# Patient Record
Sex: MALE | Race: WHITE | NOT HISPANIC OR LATINO | Employment: FULL TIME
[De-identification: names, ages, dates, MRNs, and addresses within clinical notes are randomized per-mention and may not be internally consistent; named-entity substitution may affect disease eponyms.]

---

## 2017-12-14 ENCOUNTER — RECORDS - HEALTHEAST (OUTPATIENT)
Dept: ADMINISTRATIVE | Facility: OTHER | Age: 52
End: 2017-12-14

## 2018-01-08 ENCOUNTER — HOSPITAL ENCOUNTER (OUTPATIENT)
Dept: RADIOLOGY | Facility: CLINIC | Age: 53
Discharge: HOME | End: 2018-01-08
Attending: INTERNAL MEDICINE

## 2018-01-08 DIAGNOSIS — R13.10 DYSPHAGIA: ICD-10-CM

## 2018-01-08 DIAGNOSIS — K31.9 GASTRIC ERYTHEMA: ICD-10-CM

## 2018-03-16 ENCOUNTER — RECORDS - HEALTHEAST (OUTPATIENT)
Dept: LAB | Facility: CLINIC | Age: 53
End: 2018-03-16

## 2018-03-16 LAB
ALBUMIN SERPL-MCNC: 4.3 G/DL (ref 3.5–5)
ALP SERPL-CCNC: 43 U/L (ref 45–120)
ALT SERPL W P-5'-P-CCNC: 16 U/L (ref 0–45)
ANION GAP SERPL CALCULATED.3IONS-SCNC: 12 MMOL/L (ref 5–18)
AST SERPL W P-5'-P-CCNC: 24 U/L (ref 0–40)
BASOPHILS # BLD AUTO: 0 THOU/UL (ref 0–0.2)
BASOPHILS NFR BLD AUTO: 1 % (ref 0–2)
BILIRUB SERPL-MCNC: 1 MG/DL (ref 0–1)
BUN SERPL-MCNC: 20 MG/DL (ref 8–22)
CALCIUM SERPL-MCNC: 9.4 MG/DL (ref 8.5–10.5)
CHLORIDE BLD-SCNC: 101 MMOL/L (ref 98–107)
CO2 SERPL-SCNC: 23 MMOL/L (ref 22–31)
CREAT SERPL-MCNC: 1.15 MG/DL (ref 0.7–1.3)
EOSINOPHIL # BLD AUTO: 0 THOU/UL (ref 0–0.4)
EOSINOPHIL NFR BLD AUTO: 1 % (ref 0–6)
ERYTHROCYTE [DISTWIDTH] IN BLOOD BY AUTOMATED COUNT: 13.9 % (ref 11–14.5)
FOLATE SERPL-MCNC: 14 NG/ML
GFR SERPL CREATININE-BSD FRML MDRD: >60 ML/MIN/1.73M2
GLUCOSE BLD-MCNC: 86 MG/DL (ref 70–125)
HCT VFR BLD AUTO: 31 % (ref 40–54)
HGB BLD-MCNC: 11.3 G/DL (ref 14–18)
LYMPHOCYTES # BLD AUTO: 1.7 THOU/UL (ref 0.8–4.4)
LYMPHOCYTES NFR BLD AUTO: 28 % (ref 20–40)
MCH RBC QN AUTO: 41.7 PG (ref 27–34)
MCHC RBC AUTO-ENTMCNC: 36.5 G/DL (ref 32–36)
MCV RBC AUTO: 114 FL (ref 80–100)
MONOCYTES # BLD AUTO: 0.5 THOU/UL (ref 0–0.9)
MONOCYTES NFR BLD AUTO: 8 % (ref 2–10)
NEUTROPHILS # BLD AUTO: 3.7 THOU/UL (ref 2–7.7)
NEUTROPHILS NFR BLD AUTO: 63 % (ref 50–70)
PLATELET # BLD AUTO: 205 THOU/UL (ref 140–440)
PMV BLD AUTO: 9.9 FL (ref 8.5–12.5)
POTASSIUM BLD-SCNC: 3.8 MMOL/L (ref 3.5–5)
PROT SERPL-MCNC: 7.6 G/DL (ref 6–8)
RBC # BLD AUTO: 2.71 MILL/UL (ref 4.4–6.2)
SODIUM SERPL-SCNC: 136 MMOL/L (ref 136–145)
VIT B12 SERPL-MCNC: 388 PG/ML (ref 213–816)
WBC: 5.9 THOU/UL (ref 4–11)

## 2018-04-04 ENCOUNTER — RECORDS - HEALTHEAST (OUTPATIENT)
Dept: LAB | Facility: CLINIC | Age: 53
End: 2018-04-04

## 2018-04-04 LAB
IRON SATN MFR SERPL: 33 % (ref 20–50)
IRON SERPL-MCNC: 97 UG/DL (ref 42–175)
TIBC SERPL-MCNC: 298 UG/DL (ref 313–563)
TRANSFERRIN SERPL-MCNC: 238 MG/DL (ref 212–360)

## 2019-08-16 ENCOUNTER — RECORDS - HEALTHEAST (OUTPATIENT)
Dept: ADMINISTRATIVE | Facility: OTHER | Age: 54
End: 2019-08-16

## 2019-09-19 ENCOUNTER — HOSPITAL ENCOUNTER (OUTPATIENT)
Dept: MRI IMAGING | Facility: CLINIC | Age: 54
Discharge: HOME | End: 2019-09-19
Attending: INTERNAL MEDICINE

## 2019-09-19 DIAGNOSIS — K50.812: ICD-10-CM

## 2020-03-09 ENCOUNTER — RECORDS - HEALTHEAST (OUTPATIENT)
Dept: LAB | Facility: CLINIC | Age: 55
End: 2020-03-09

## 2020-03-09 LAB
BASOPHILS # BLD AUTO: 0 THOU/UL (ref 0–0.2)
BASOPHILS NFR BLD AUTO: 1 % (ref 0–2)
EOSINOPHIL # BLD AUTO: 0.2 THOU/UL (ref 0–0.4)
EOSINOPHIL NFR BLD AUTO: 3 % (ref 0–6)
ERYTHROCYTE [DISTWIDTH] IN BLOOD BY AUTOMATED COUNT: 13.8 % (ref 11–14.5)
HCT VFR BLD AUTO: 39.6 % (ref 40–54)
HGB BLD-MCNC: 13.2 G/DL (ref 14–18)
LYMPHOCYTES # BLD AUTO: 2.2 THOU/UL (ref 0.8–4.4)
LYMPHOCYTES NFR BLD AUTO: 37 % (ref 20–40)
MCH RBC QN AUTO: 36.7 PG (ref 27–34)
MCHC RBC AUTO-ENTMCNC: 33.3 G/DL (ref 32–36)
MCV RBC AUTO: 110 FL (ref 80–100)
MONOCYTES # BLD AUTO: 0.4 THOU/UL (ref 0–0.9)
MONOCYTES NFR BLD AUTO: 7 % (ref 2–10)
NEUTROPHILS # BLD AUTO: 3.1 THOU/UL (ref 2–7.7)
NEUTROPHILS NFR BLD AUTO: 51 % (ref 50–70)
PLATELET # BLD AUTO: 254 THOU/UL (ref 140–440)
PMV BLD AUTO: 9.7 FL (ref 8.5–12.5)
RBC # BLD AUTO: 3.6 MILL/UL (ref 4.4–6.2)
WBC: 6 THOU/UL (ref 4–11)

## 2021-05-31 ENCOUNTER — RECORDS - HEALTHEAST (OUTPATIENT)
Dept: ADMINISTRATIVE | Facility: CLINIC | Age: 56
End: 2021-05-31

## 2021-06-01 ENCOUNTER — RECORDS - HEALTHEAST (OUTPATIENT)
Dept: ADMINISTRATIVE | Facility: CLINIC | Age: 56
End: 2021-06-01

## 2021-06-02 ENCOUNTER — RECORDS - HEALTHEAST (OUTPATIENT)
Dept: ADMINISTRATIVE | Facility: CLINIC | Age: 56
End: 2021-06-02

## 2021-06-15 NOTE — PROGRESS NOTES
"Speech Language/Pathology  Videofluoroscopic Swallow Study       Problem:  Patient Active Problem List   Diagnosis     Concussion, unspecified     Vertigo     Concussion syndrome       Onset date: 10/2017  Reason for evaluation: globus sensation from   Pertinent History: MR on 2/1/15 with the following results: \"Areas of low attenuation within the midbrain, frankie, and right middle cerebellar peduncle on the prior head CT correspond to clustered CSF signal intensity structures without adjacent edema, significant mass effect, pathologic enhancement, or restricted diffusion. This appearance is most suggestive of clustered prominent perivascular spaces\". Crohn's disease. Reports change in taste. Gradual onset. Denies coughing with food or liquid.   Current Diet: regular with thin  Baseline Diet: same    Patient presents as alert and cooperative during this evaluation.   An  was not applicable    Patient was given puree, honey, nectar and thin.    Oral Phase:    Dentition/Oral hygiene: adequate    Bolus prep and oral control was mildly impaired.     Anterior-Posterior transit was not impaired.    Premature spillage past base of tongue occurred with all textures trialed.    Tongue base retraction was not impaired.    Oral stasis did not occur with any texture.    Pharyngeal Phase:    Aspiration did not occur with any texture.     Laryngeal penetration occurred with thin. Shallow and transient, isolated incident, during multiple consecutive swallows via straw.     Swallow response was delayed with all textures trialed. Pourover occurred with all textures trialed. To the valleculae with puree and honey-thick liquids, to the pyriform sinuses with nectar-thick and thin liquids.     Epiglottic movement was complete consistently across texture trials.    Pharyngeal stasis did not occur with any texture.     Pharyngeal constriction was not impaired.    Hyolaryngeal elevation was not impaired. Hyolaryngeal excursion was " not impaired.    Cricopharyngeal function was not impaired. Cervical esophageal function was not observed .    Assessment:    Patient demonstrated mild oral and mild pharyngeal dysphagia. Does not appear to be related to patient's reported symptoms and reason for evaluation.    Patient is at minimal aspiration risk with all intake.    Rehab potential is good based on evaluation results and medical status.    Recommendations:    Plan: Regular and thin liquids    Strategies: Upright to 90 degrees for all po intake    Speech therapy is not recommended at this time    Referrals: N/A    23 dysphagia minutes    Michael Worthington MA, CCC-SLP

## 2021-06-26 ENCOUNTER — HEALTH MAINTENANCE LETTER (OUTPATIENT)
Age: 56
End: 2021-06-26

## 2021-10-16 ENCOUNTER — HEALTH MAINTENANCE LETTER (OUTPATIENT)
Age: 56
End: 2021-10-16

## 2022-07-17 ENCOUNTER — HEALTH MAINTENANCE LETTER (OUTPATIENT)
Age: 57
End: 2022-07-17

## 2022-09-25 ENCOUNTER — HEALTH MAINTENANCE LETTER (OUTPATIENT)
Age: 57
End: 2022-09-25

## 2022-10-27 ENCOUNTER — APPOINTMENT (OUTPATIENT)
Dept: CT IMAGING | Facility: HOSPITAL | Age: 57
End: 2022-10-27
Attending: EMERGENCY MEDICINE
Payer: COMMERCIAL

## 2022-10-27 ENCOUNTER — HOSPITAL ENCOUNTER (EMERGENCY)
Facility: HOSPITAL | Age: 57
Discharge: HOME | End: 2022-10-27
Attending: EMERGENCY MEDICINE | Admitting: EMERGENCY MEDICINE
Payer: COMMERCIAL

## 2022-10-27 VITALS
SYSTOLIC BLOOD PRESSURE: 111 MMHG | HEART RATE: 68 BPM | OXYGEN SATURATION: 100 % | BODY MASS INDEX: 18.99 KG/M2 | TEMPERATURE: 98.4 F | RESPIRATION RATE: 20 BRPM | DIASTOLIC BLOOD PRESSURE: 67 MMHG | WEIGHT: 140 LBS

## 2022-10-27 DIAGNOSIS — N20.1 URETEROLITHIASIS: ICD-10-CM

## 2022-10-27 DIAGNOSIS — N23 RENAL COLIC: ICD-10-CM

## 2022-10-27 PROBLEM — E53.8 COBALAMIN DEFICIENCY: Status: ACTIVE | Noted: 2018-05-23

## 2022-10-27 PROBLEM — K31.9 DISORDER OF STOMACH: Status: ACTIVE | Noted: 2017-12-06

## 2022-10-27 PROBLEM — R13.10 DYSPHAGIA: Status: ACTIVE | Noted: 2017-12-04

## 2022-10-27 PROBLEM — E53.9 VITAMIN B DEFICIENCY: Status: ACTIVE | Noted: 2018-05-23

## 2022-10-27 PROBLEM — K20.90 ESOPHAGITIS: Status: ACTIVE | Noted: 2017-12-08

## 2022-10-27 LAB
ALBUMIN UR-MCNC: 20 MG/DL
ANION GAP SERPL CALCULATED.3IONS-SCNC: 11 MMOL/L (ref 7–15)
APPEARANCE UR: CLEAR
BILIRUB UR QL STRIP: NEGATIVE
BUN SERPL-MCNC: 15.3 MG/DL (ref 6–20)
CALCIUM SERPL-MCNC: 9.3 MG/DL (ref 8.6–10)
CAOX CRY #/AREA URNS HPF: ABNORMAL /HPF
CHLORIDE SERPL-SCNC: 104 MMOL/L (ref 98–107)
COLOR UR AUTO: YELLOW
CREAT SERPL-MCNC: 1.1 MG/DL (ref 0.67–1.17)
DEPRECATED HCO3 PLAS-SCNC: 24 MMOL/L (ref 22–29)
ERYTHROCYTE [DISTWIDTH] IN BLOOD BY AUTOMATED COUNT: 12.4 % (ref 10–15)
GFR SERPL CREATININE-BSD FRML MDRD: 79 ML/MIN/1.73M2
GLUCOSE SERPL-MCNC: 142 MG/DL (ref 70–99)
GLUCOSE UR STRIP-MCNC: NEGATIVE MG/DL
HCT VFR BLD AUTO: 38.6 % (ref 40–53)
HGB BLD-MCNC: 12.8 G/DL (ref 13.3–17.7)
HGB UR QL STRIP: ABNORMAL
HYALINE CASTS: 1 /LPF
KETONES UR STRIP-MCNC: NEGATIVE MG/DL
LEUKOCYTE ESTERASE UR QL STRIP: ABNORMAL
MCH RBC QN AUTO: 33.3 PG (ref 26.5–33)
MCHC RBC AUTO-ENTMCNC: 33.2 G/DL (ref 31.5–36.5)
MCV RBC AUTO: 101 FL (ref 78–100)
MUCOUS THREADS #/AREA URNS LPF: PRESENT /LPF
NITRATE UR QL: NEGATIVE
PH UR STRIP: 5.5 [PH] (ref 5–7)
PLATELET # BLD AUTO: 257 10E3/UL (ref 150–450)
POTASSIUM SERPL-SCNC: 3.9 MMOL/L (ref 3.4–5.3)
RBC # BLD AUTO: 3.84 10E6/UL (ref 4.4–5.9)
RBC URINE: 83 /HPF
SODIUM SERPL-SCNC: 139 MMOL/L (ref 136–145)
SP GR UR STRIP: 1.02 (ref 1–1.03)
SQUAMOUS EPITHELIAL: <1 /HPF
UROBILINOGEN UR STRIP-MCNC: <2 MG/DL
WBC # BLD AUTO: 6.7 10E3/UL (ref 4–11)
WBC URINE: 30 /HPF

## 2022-10-27 PROCEDURE — 80048 BASIC METABOLIC PNL TOTAL CA: CPT | Performed by: EMERGENCY MEDICINE

## 2022-10-27 PROCEDURE — 96375 TX/PRO/DX INJ NEW DRUG ADDON: CPT

## 2022-10-27 PROCEDURE — 74176 CT ABD & PELVIS W/O CONTRAST: CPT

## 2022-10-27 PROCEDURE — 96376 TX/PRO/DX INJ SAME DRUG ADON: CPT

## 2022-10-27 PROCEDURE — 96361 HYDRATE IV INFUSION ADD-ON: CPT

## 2022-10-27 PROCEDURE — 250N000011 HC RX IP 250 OP 636: Performed by: EMERGENCY MEDICINE

## 2022-10-27 PROCEDURE — 99285 EMERGENCY DEPT VISIT HI MDM: CPT | Mod: 25

## 2022-10-27 PROCEDURE — 85027 COMPLETE CBC AUTOMATED: CPT | Performed by: EMERGENCY MEDICINE

## 2022-10-27 PROCEDURE — 81001 URINALYSIS AUTO W/SCOPE: CPT | Performed by: EMERGENCY MEDICINE

## 2022-10-27 PROCEDURE — 96374 THER/PROPH/DIAG INJ IV PUSH: CPT

## 2022-10-27 PROCEDURE — 36415 COLL VENOUS BLD VENIPUNCTURE: CPT | Performed by: EMERGENCY MEDICINE

## 2022-10-27 PROCEDURE — 87086 URINE CULTURE/COLONY COUNT: CPT | Performed by: EMERGENCY MEDICINE

## 2022-10-27 PROCEDURE — 258N000003 HC RX IP 258 OP 636: Performed by: EMERGENCY MEDICINE

## 2022-10-27 RX ORDER — KETOROLAC TROMETHAMINE 15 MG/ML
15 INJECTION, SOLUTION INTRAMUSCULAR; INTRAVENOUS ONCE
Status: COMPLETED | OUTPATIENT
Start: 2022-10-27 | End: 2022-10-27

## 2022-10-27 RX ORDER — ONDANSETRON 4 MG/1
4 TABLET, ORALLY DISINTEGRATING ORAL EVERY 6 HOURS PRN
Qty: 10 TABLET | Refills: 0 | Status: SHIPPED | OUTPATIENT
Start: 2022-10-27

## 2022-10-27 RX ORDER — IBUPROFEN 600 MG/1
600 TABLET, FILM COATED ORAL EVERY 6 HOURS PRN
Qty: 30 TABLET | Refills: 0 | Status: SHIPPED | OUTPATIENT
Start: 2022-10-27

## 2022-10-27 RX ORDER — ACETAMINOPHEN 500 MG
1000 TABLET ORAL 3 TIMES DAILY
Qty: 30 TABLET | Refills: 0 | Status: SHIPPED | OUTPATIENT
Start: 2022-10-27

## 2022-10-27 RX ORDER — OXYCODONE HYDROCHLORIDE 5 MG/1
5 TABLET ORAL EVERY 6 HOURS PRN
Qty: 12 TABLET | Refills: 0 | Status: SHIPPED | OUTPATIENT
Start: 2022-10-27 | End: 2022-10-30

## 2022-10-27 RX ORDER — ONDANSETRON 2 MG/ML
4 INJECTION INTRAMUSCULAR; INTRAVENOUS ONCE
Status: COMPLETED | OUTPATIENT
Start: 2022-10-27 | End: 2022-10-27

## 2022-10-27 RX ORDER — HYDROMORPHONE HYDROCHLORIDE 1 MG/ML
0.5 INJECTION, SOLUTION INTRAMUSCULAR; INTRAVENOUS; SUBCUTANEOUS
Status: DISCONTINUED | OUTPATIENT
Start: 2022-10-27 | End: 2022-10-27 | Stop reason: HOSPADM

## 2022-10-27 RX ADMIN — KETOROLAC TROMETHAMINE 15 MG: 15 INJECTION, SOLUTION INTRAMUSCULAR; INTRAVENOUS at 04:01

## 2022-10-27 RX ADMIN — SODIUM CHLORIDE 1000 ML: 9 INJECTION, SOLUTION INTRAVENOUS at 01:57

## 2022-10-27 RX ADMIN — ONDANSETRON 4 MG: 2 INJECTION INTRAMUSCULAR; INTRAVENOUS at 01:51

## 2022-10-27 RX ADMIN — KETOROLAC TROMETHAMINE 15 MG: 15 INJECTION, SOLUTION INTRAMUSCULAR; INTRAVENOUS at 01:51

## 2022-10-27 ASSESSMENT — ACTIVITIES OF DAILY LIVING (ADL): ADLS_ACUITY_SCORE: 35

## 2022-10-27 ASSESSMENT — ENCOUNTER SYMPTOMS
VOMITING: 0
FLANK PAIN: 1
DIARRHEA: 0
BACK PAIN: 1
CHILLS: 0
DIAPHORESIS: 0
FEVER: 0

## 2022-10-27 NOTE — DISCHARGE INSTRUCTIONS
I recommend taking 600 mg of ibuprofen three times a day in addition to 1000 mg of tylenol three times a day. Its OK to take these at the same time during this period of severe pain. After a couple of days you can take either tylenol (every 6 hours) or ibuprofen (every four hours) as needed.    For breakthrough pain you can take 5-10 mg of oxycodone every 4-6 hours.     Zofran will help with nausea.  You can take this as needed.    Please call [facility] urology, let them know that you have a 1.3 cm kidney stone that your right UVJ.  Let them know how your symptoms are doing.  They will help schedule follow-up.    If you are taking all of the medications and your pain is still severe, or you are vomiting and cannot keep your medications down, please come back to the emergency department for assistance.  If you develop a fever we would like to see you back immediately.

## 2022-10-27 NOTE — ED PROVIDER NOTES
EMERGENCY DEPARTMENT ENCOUNTER      NAME: Denys García  AGE: 57 year old male  YOB: 1965  MRN: [id number]  EVALUATION DATE & TIME: 10/27/2022  1:35 AM    PCP: Susan Jacobson    ED PROVIDER: Herson Luciano M.D.      Chief Complaint   Patient presents with     Flank Pain         FINAL IMPRESSION:  1. Ureterolithiasis    2. Renal colic          ED COURSE & MEDICAL DECISION MAKING:    Pertinent Labs & Imaging studies reviewed. (See chart for details)  ED Course as of 10/27/22 0347   Thu Oct 27, 2022   0144 Patient is a 57-year-old gentleman with a history of Crohn's, on Remicade, history of kidney stones, here with sudden onset right flank pain in the last few hours.  On exam he is uncomfortable appearing, has tenderness in the right lower quadrant, but pain began in the right flank.  Plan to give IV fluids, Toradol, Zofran, obtain CT scan to rule out kidney stone.  Differential also includes appendicitis, bowel obstruction however this is less likely given the clinical presentation.   0238 Based on my interpretation patient has a 1.25 cm stone at the right UVJ.  Right-sided hydro.  Awaiting radiology interpretation   0329 Rechecked the patient.  He is feeling much better after Toradol.  He has seen [facility] in the past for his kidney stones.  I will send him home with NSAIDs, Zofran, oxycodone, and instructions on what to say when he calls the clinic in the morning.  We discussed return precautions.   0337 Urinalysis has RBCs, WBCs, but negative nitrate.  He is afebrile with normal white blood cell count.  I do not think he has an infected stone.         Additional ED Course Timestamps:    1:40 AM I met with the patient to gather history and to perform my initial exam. I discussed the plan for care while in the Emergency Department. PPE (N95 mask) was worn during patient encounters.   3:22 AM I updated and reevaluated patient.    At the conclusion of the encounter I discussed the results  of all of the tests and the disposition. The questions were answered. The patient or family acknowledged understanding and was agreeable with the care plan.         MEDICATIONS GIVEN IN THE EMERGENCY:  Medications   HYDROmorphone (PF) (DILAUDID) injection 0.5 mg (has no administration in time range)   0.9% sodium chloride BOLUS (0 mLs Intravenous Stopped 10/27/22 0306)   ketorolac (TORADOL) injection 15 mg (15 mg Intravenous Given 10/27/22 0151)   ondansetron (ZOFRAN) injection 4 mg (4 mg Intravenous Given 10/27/22 0151)         NEW PRESCRIPTIONS STARTED AT TODAY'S ER VISIT  New Prescriptions    ACETAMINOPHEN (TYLENOL) 500 MG TABLET    Take 2 tablets (1,000 mg) by mouth 3 times daily    IBUPROFEN (ADVIL/MOTRIN) 600 MG TABLET    Take 1 tablet (600 mg) by mouth every 6 hours as needed for moderate pain    ONDANSETRON (ZOFRAN ODT) 4 MG ODT TAB    Take 1 tablet (4 mg) by mouth every 6 hours as needed for nausea    OXYCODONE (ROXICODONE) 5 MG TABLET    Take 1 tablet (5 mg) by mouth every 6 hours as needed for pain          =================================================================    HPI    Patient information was obtained from: Patient    Use of : N/A        Denys García is a 57 year old male with a pertinent history of Crohn's disease and regional enteritis who presents to this ED for evaluation of flank pain. Patient states that at 2200 (~3 hours ago) he developed right-sided flank pain that radiates into his right lower back. Patient notes that he has had a kidney stone before that was left-sided and he had nausea and diaphoresis. He has not taken any medications to relieve pain. Denies vomiting, diaphoresis, fever, chills, and diarrhea. Patient is on remicade that he takes every 8 weeks.         REVIEW OF SYSTEMS   Review of Systems   Constitutional: Negative for chills, diaphoresis and fever.   Gastrointestinal: Negative for diarrhea and vomiting.   Genitourinary: Positive for flank pain  (right).   Musculoskeletal: Positive for back pain (right lower).       PAST MEDICAL HISTORY:  No past medical history on file.    PAST SURGICAL HISTORY:  Past Surgical History:   Procedure Laterality Date     APPENDECTOMY       IR AORTIC ARCH 4 VESSEL ANGIOGRAM  4/14/2008     IR MISCELLANEOUS PROCEDURE  4/14/2008     IR MISCELLANEOUS PROCEDURE  4/14/2008     IR MISCELLANEOUS PROCEDURE  4/14/2008     SMALL INTESTINE SURGERY      partial small bowel resection for crohns           CURRENT MEDICATIONS:    Current Facility-Administered Medications   Medication     HYDROmorphone (PF) (DILAUDID) injection 0.5 mg     Current Outpatient Medications   Medication     acetaminophen (TYLENOL) 500 MG tablet     ibuprofen (ADVIL/MOTRIN) 600 MG tablet     ondansetron (ZOFRAN ODT) 4 MG ODT tab     oxyCODONE (ROXICODONE) 5 MG tablet     aspirin-acetaminophen-caffeine (EXCEDRIN MIGRAINE) 250-250-65 mg per tablet     inFLIXimab (REMICADE) 100 mg injection       ALLERGIES:  Allergies   Allergen Reactions     Contrast Dye Rash     Itchy about five years ago  Itchy about five years ago       Iodinated Contrast Media [Diagnostic X-Ray Materials] Rash     Itchy about five years ago       FAMILY HISTORY:  No family history on file.    SOCIAL HISTORY:   Social History     Socioeconomic History     Marital status:    Substance and Sexual Activity     Sexual activity: Not Currently       VITALS:  /74   Pulse 77   Temp 98.4  F (36.9  C) (Temporal)   Resp 20   Wt 63.5 kg (140 lb)   SpO2 100%   BMI 18.99 kg/m      PHYSICAL EXAM    Constitutional: Well developed, well nourished. Uncomfortable appearing.  HENT: Normocephalic, atraumatic, mucous membranes moist, nose normal. Neck- Supple, gross ROM intact.   Eyes: Pupils mid-range, conjunctiva without injection, no discharge.   Respiratory: Clear to auscultation bilaterally, no respiratory distress, no wheezing, speaks full sentences easily. No cough.  Cardiovascular: Normal  heart rate, regular rhythm, no murmurs.   GI: Soft, no masses. RLQ tenderness and guarding.  Musculoskeletal: Moving all 4 extremities intentionally and without pain. No obvious deformity.  Skin: Warm, dry, no rash.  Neurologic: Alert & oriented x 3, cranial nerves grossly intact.  Psychiatric: Affect normal, cooperative.       LAB:  All pertinent labs reviewed and interpreted.  Labs Ordered and Resulted from Time of ED Arrival to Time of ED Departure   CBC WITH PLATELETS - Abnormal       Result Value    WBC Count 6.7      RBC Count 3.84 (*)     Hemoglobin 12.8 (*)     Hematocrit 38.6 (*)      (*)     MCH 33.3 (*)     MCHC 33.2      RDW 12.4      Platelet Count 257     BASIC METABOLIC PANEL - Abnormal    Sodium 139      Potassium 3.9      Chloride 104      Carbon Dioxide (CO2) 24      Anion Gap 11      Urea Nitrogen 15.3      Creatinine 1.10      Calcium 9.3      Glucose 142 (*)     GFR Estimate 79     ROUTINE UA WITH MICROSCOPIC REFLEX TO CULTURE - Abnormal    Color Urine Yellow      Appearance Urine Clear      Glucose Urine Negative      Bilirubin Urine Negative      Ketones Urine Negative      Specific Gravity Urine 1.024      Blood Urine 0.2 mg/dL (*)     pH Urine 5.5      Protein Albumin Urine 20 (*)     Urobilinogen Urine <2.0      Nitrite Urine Negative      Leukocyte Esterase Urine 75 Valdo/uL (*)     Mucus Urine Present (*)     Calcium Oxalate Crystals Urine Few (*)     RBC Urine 83 (*)     WBC Urine 30 (*)     Squamous Epithelials Urine <1      Hyaline Casts Urine 1     URINE CULTURE       RADIOLOGY:  Reviewed all pertinent imaging. Please see official radiology report.  Abd/pelvis CT no contrast - Stone Protocol   Final Result   IMPRESSION:    1.  1.3 cm obstructing stone at the right ureteral vesicular junction associated with moderate upstream right hydroureteronephrosis. Just upstream of the dominant stone there is an additional 4 mm calculus in the distal right ureter.   2.  Numerous  nonobstructing calyceal calculi of both kidneys the largest of which on the right measures 4 mm and the largest on the left measures 1.0 cm.             EKG:    All EKG interpretations will be found in ED course above.      I, Carmen Bautista am serving as a scribe to document services personally performed by Dr. Herson Luciano based on my observation and the provider's statements to me. I, Herson Luciano MD attest that Carmen Bautista is acting in a scribe capacity, has observed my performance of the services and has documented them in accordance with my direction.    Herson Luciano M.D.  Emergency Medicine  [facility] EMERGENCY DEPARTMENT  [facility] [facility] [id number] [phone]  Dept: [phone]     Herson Luciano MD  10/27/22 0349

## 2022-10-27 NOTE — Clinical Note
Denys García was seen and treated in our emergency department on 10/27/2022.  He may return to work on 10/31/2022.       If you have any questions or concerns, please don't hesitate to call.      Herson Luciano MD

## 2022-10-27 NOTE — ED TRIAGE NOTES
Right flank pain started at work 1 hour ago.  No vomiting or diarrhea.  History of kidney stones     Triage Assessment     Row Name 10/27/22 0133       Triage Assessment (Adult)    Airway WDL WDL       Respiratory WDL    Respiratory WDL WDL       Skin Circulation/Temperature WDL    Skin Circulation/Temperature WDL WDL       Cardiac WDL    Cardiac WDL WDL       Peripheral/Neurovascular WDL    Peripheral Neurovascular WDL WDL       Cognitive/Neuro/Behavioral WDL    Cognitive/Neuro/Behavioral WDL WDL

## 2022-10-28 LAB — BACTERIA UR CULT: NORMAL

## 2022-11-22 ENCOUNTER — HOSPITAL ENCOUNTER (EMERGENCY)
Facility: HOSPITAL | Age: 57
Discharge: HOME | End: 2022-11-22
Attending: EMERGENCY MEDICINE | Admitting: EMERGENCY MEDICINE
Payer: COMMERCIAL

## 2022-11-22 VITALS
DIASTOLIC BLOOD PRESSURE: 85 MMHG | SYSTOLIC BLOOD PRESSURE: 113 MMHG | OXYGEN SATURATION: 100 % | RESPIRATION RATE: 20 BRPM | HEART RATE: 75 BPM | TEMPERATURE: 98.9 F | WEIGHT: 148 LBS | BODY MASS INDEX: 20.05 KG/M2 | HEIGHT: 72 IN

## 2022-11-22 DIAGNOSIS — R33.9 URINARY RETENTION: ICD-10-CM

## 2022-11-22 LAB
ALBUMIN UR-MCNC: 50 MG/DL
APPEARANCE UR: ABNORMAL
BILIRUB UR QL STRIP: NEGATIVE
CAOX CRY #/AREA URNS HPF: ABNORMAL /HPF
COLOR UR AUTO: YELLOW
GLUCOSE UR STRIP-MCNC: NEGATIVE MG/DL
HGB UR QL STRIP: ABNORMAL
HYALINE CASTS: 1 /LPF
KETONES UR STRIP-MCNC: NEGATIVE MG/DL
LEUKOCYTE ESTERASE UR QL STRIP: ABNORMAL
MUCOUS THREADS #/AREA URNS LPF: PRESENT /LPF
NITRATE UR QL: NEGATIVE
PH UR STRIP: 5.5 [PH] (ref 5–7)
RBC URINE: 56 /HPF
SP GR UR STRIP: 1.03 (ref 1–1.03)
SQUAMOUS EPITHELIAL: <1 /HPF
UROBILINOGEN UR STRIP-MCNC: <2 MG/DL
WBC URINE: 28 /HPF

## 2022-11-22 PROCEDURE — 87086 URINE CULTURE/COLONY COUNT: CPT | Performed by: EMERGENCY MEDICINE

## 2022-11-22 PROCEDURE — 51702 INSERT TEMP BLADDER CATH: CPT

## 2022-11-22 PROCEDURE — 51798 US URINE CAPACITY MEASURE: CPT

## 2022-11-22 PROCEDURE — 99284 EMERGENCY DEPT VISIT MOD MDM: CPT

## 2022-11-22 PROCEDURE — 250N000009 HC RX 250: Performed by: EMERGENCY MEDICINE

## 2022-11-22 PROCEDURE — 81001 URINALYSIS AUTO W/SCOPE: CPT | Performed by: EMERGENCY MEDICINE

## 2022-11-22 PROCEDURE — 250N000013 HC RX MED GY IP 250 OP 250 PS 637: Performed by: EMERGENCY MEDICINE

## 2022-11-22 RX ORDER — LIDOCAINE HYDROCHLORIDE 20 MG/ML
10 JELLY TOPICAL ONCE
Status: COMPLETED | OUTPATIENT
Start: 2022-11-22 | End: 2022-11-22

## 2022-11-22 RX ORDER — HYDROCODONE BITARTRATE AND ACETAMINOPHEN 5; 325 MG/1; MG/1
1 TABLET ORAL ONCE
Status: COMPLETED | OUTPATIENT
Start: 2022-11-22 | End: 2022-11-22

## 2022-11-22 RX ADMIN — HYDROCODONE BITARTRATE AND ACETAMINOPHEN 1 TABLET: 5; 325 TABLET ORAL at 03:17

## 2022-11-22 RX ADMIN — LIDOCAINE HYDROCHLORIDE 10 ML: 20 JELLY TOPICAL at 03:08

## 2022-11-22 ASSESSMENT — ACTIVITIES OF DAILY LIVING (ADL): ADLS_ACUITY_SCORE: 35

## 2022-11-22 NOTE — ED PROVIDER NOTES
EMERGENCY DEPARTMENT ENCOUnter      NAME: Denys García  AGE: 57 year old male  YOB: 1965  MRN: [id number]  EVALUATION DATE & TIME: 2022  2:03 AM    PCP: Susan Jacobson    ED PROVIDER: Ricky Ward DO      Chief Complaint   Patient presents with     unable to urinate         FINAL IMPRESSION:  1. Urinary retention          ED COURSE & MEDICAL DECISION MAKIN:14 AM I performed my initial interview and exam. Patient was seen in ED room 23.       The patient presented to the emergency department today with complaints of urinary retention.  Greater than 1 L of urine was noted on his bladder scan.  A Paul catheter was placed and he is feeling much better after this.  He has an appointment to see his urologist soon which I have encouraged him to keep.  I feel that he can be safely discharged home and he is comfortable with this plan.      Medical Decision Making    History:    Supplemental history from: N/A    External Record(s) reviewed: Documented in HPI, if applicable.    Work Up:    Chart documentation includes differential considered and any EKGs or imaging interpreted by provider.    In additional to work up documented, I considered the following work up: See chart documentation, if applicable.    External consultation:    Discussion of management with another provider: See chart documentation, if applicable    Complicating factors:    Care impacted by chronic illness: None    Care affected by social determinants of health: N/A    Disposition considerations: Discharge. No recommendations on prescription strength medication(s). I considered admission, but discharged patient after significant clinical improvement.        At the conclusion of the encounter I discussed the results of all of the tests and the disposition. The questions were answered. The patient or family acknowledged understanding and was agreeable with the care plan.         MEDICATIONS GIVEN IN THE  EMERGENCY:  Medications   lidocaine (XYLOCAINE) 2 % external gel 10 mL (10 mLs Urethral Given 11/22/22 0308)   HYDROcodone-acetaminophen (NORCO) 5-325 MG per tablet 1 tablet (1 tablet Oral Given 11/22/22 0317)       =================================================================    HPI      Denys García is a 57 year old male with a pertinent history of Crohn's disease, regional enteritis, and calculus of kidney, who presents to this ED by walk in for evaluation of urinary retention.    Patient reports that he has a known 1.5 cm kidney stone and is scheduled to have it surgically removed on 11/23. Patient reports that tonight he was unable to urinate due to a sharp pain. Patient last urinated regularly this morning. Patient denies flank pain or any other concerns at this time.    REVIEW OF SYSTEMS     Constitutional:  Denies fever or chills  HENT:  Denies sore throat   Respiratory:  Denies cough or shortness of breath   Cardiovascular:  Denies chest pain or palpitations  GI:  Denies abdominal pain, nausea, or vomiting  : Endorses urinary retention.  Musculoskeletal:  Denies any new extremity pain   Skin:  Denies rash   Neurologic:  Denies headache, focal weakness or sensory changes    All other systems reviewed and are negative      PAST MEDICAL HISTORY:  No past medical history on file.    PAST SURGICAL HISTORY:  Past Surgical History:   Procedure Laterality Date     APPENDECTOMY       IR AORTIC ARCH 4 VESSEL ANGIOGRAM  4/14/2008     IR MISCELLANEOUS PROCEDURE  4/14/2008     IR MISCELLANEOUS PROCEDURE  4/14/2008     IR MISCELLANEOUS PROCEDURE  4/14/2008     SMALL INTESTINE SURGERY      partial small bowel resection for crohns           CURRENT MEDICATIONS:    acetaminophen (TYLENOL) 500 MG tablet  aspirin-acetaminophen-caffeine (EXCEDRIN MIGRAINE) 250-250-65 mg per tablet  ibuprofen (ADVIL/MOTRIN) 600 MG tablet  inFLIXimab (REMICADE) 100 mg injection  ondansetron (ZOFRAN ODT) 4 MG ODT  tab        ALLERGIES:  Allergies   Allergen Reactions     Contrast Dye Rash     Itchy about five years ago  Itchy about five years ago       Iodinated Contrast Media [Diagnostic X-Ray Materials] Rash     Itchy about five years ago       FAMILY HISTORY:  No family history on file.    SOCIAL HISTORY:   Social History     Socioeconomic History     Marital status:    Substance and Sexual Activity     Sexual activity: Not Currently       VITALS:  Patient Vitals for the past 24 hrs:   BP Temp Temp src Pulse Resp SpO2 Height Weight   11/22/22 0315 127/73 -- -- -- -- -- -- --   11/22/22 0300 120/85 -- -- -- -- -- -- --   11/22/22 0245 118/72 -- -- -- -- -- -- --   11/22/22 0244 -- -- -- 88 -- 100 % -- --   11/22/22 0230 128/76 -- -- 87 -- 100 % -- --   11/22/22 0215 117/70 -- -- 90 -- 100 % -- --   11/22/22 0208 122/86 -- -- 95 -- 100 % -- --   11/22/22 0159 (!) 134/94 98.9  F (37.2  C) Temporal 100 20 100 % 1.829 m (6') 67.1 kg (148 lb)       PHYSICAL EXAM    Constitutional:  Well developed, Well nourished,  HENT:  Normocephalic, Atraumatic, Bilateral external ears normal, Oropharynx moist, Nose normal.   Neck:  Normal range of motion, No meningismus, No stridor.   Eyes:  EOMI, Conjunctiva normal, No discharge.   Respiratory:  Normal breath sounds, No respiratory distress, No wheezing, No chest tenderness.   Cardiovascular:  Normal heart rate, Normal rhythm  GI:  Soft, mild suprapubic tenderness, No guarding, No CVA tenderness.   Musculoskeletal:   No tenderness to palpation or major deformities noted.   Integument:  Warm, Dry, No erythema, No rash.   Neurologic:  Alert & oriented x 3, Normal motor function, Normal sensory function, No focal deficits noted.   Psychiatric:  Affect normal, Judgment normal, Mood normal.          Ha DARLING, am serving as a scribe to document services personally performed by Dr. Ward based on my observation and the provider's statements to me. I, Ricky Ward, DO attest that  Ha Spivey is acting in a scribe capacity, has observed my performance of the services and has documented them in accordance with my direction.    Ricky Ward   Emergency Medicine  [facility] EMERGENCY DEPARTMENT  [address] [place] [id number] [phone]  Dept: [phone]       Ricky Ward MD  11/22/22 0416

## 2022-11-22 NOTE — ED NOTES
Paul catheter placed using coude catheter. Unable to advance catheter using standard 16 [place] catheter.

## 2022-11-22 NOTE — DISCHARGE INSTRUCTIONS
A Paul catheter was placed in your bladder today.  Keep this in place and follow-up with your urologist in the next several days.  Return to the ER for any worsening symptoms or other concerns.

## 2022-11-22 NOTE — ED TRIAGE NOTES
Pt here with the inability to urinate. States his last normal urination was this morning. Pt states he knows he has a kidney stone and believes it to be trying to pass. He has does not have any flank pain.     Triage Assessment     Row Name 11/22/22 0201       Triage Assessment (Adult)    Airway WDL WDL       Respiratory WDL    Respiratory WDL WDL       Skin Circulation/Temperature WDL    Skin Circulation/Temperature WDL WDL       Cardiac WDL    Cardiac WDL WDL       Peripheral/Neurovascular WDL    Peripheral Neurovascular WDL WDL       Cognitive/Neuro/Behavioral WDL    Cognitive/Neuro/Behavioral WDL WDL

## 2022-11-23 LAB — BACTERIA UR CULT: NO GROWTH

## 2023-05-24 ENCOUNTER — LAB REQUISITION (OUTPATIENT)
Dept: LAB | Facility: CLINIC | Age: 58
End: 2023-05-24

## 2023-05-24 LAB
ERYTHROCYTE [DISTWIDTH] IN BLOOD BY AUTOMATED COUNT: 14 % (ref 10–15)
HCT VFR BLD AUTO: 38.9 % (ref 40–53)
HGB BLD-MCNC: 12.4 G/DL (ref 13.3–17.7)
MCH RBC QN AUTO: 32 PG (ref 26.5–33)
MCHC RBC AUTO-ENTMCNC: 31.9 G/DL (ref 31.5–36.5)
MCV RBC AUTO: 100 FL (ref 78–100)
PLATELET # BLD AUTO: 258 10E3/UL (ref 150–450)
RBC # BLD AUTO: 3.88 10E6/UL (ref 4.4–5.9)
WBC # BLD AUTO: 7.2 10E3/UL (ref 4–11)

## 2023-05-24 PROCEDURE — 85027 COMPLETE CBC AUTOMATED: CPT | Performed by: NURSE PRACTITIONER

## 2023-06-05 ENCOUNTER — LAB REQUISITION (OUTPATIENT)
Dept: LAB | Facility: CLINIC | Age: 58
End: 2023-06-05
Payer: COMMERCIAL

## 2023-06-05 DIAGNOSIS — M72.0 PALMAR FASCIAL FIBROMATOSIS (DUPUYTREN): ICD-10-CM

## 2023-06-05 PROCEDURE — 88304 TISSUE EXAM BY PATHOLOGIST: CPT | Mod: 26 | Performed by: PATHOLOGY

## 2023-06-05 PROCEDURE — 88304 TISSUE EXAM BY PATHOLOGIST: CPT | Mod: TC,ORL | Performed by: STUDENT IN AN ORGANIZED HEALTH CARE EDUCATION/TRAINING PROGRAM

## 2023-06-07 LAB
PATH REPORT.COMMENTS IMP SPEC: NORMAL
PATH REPORT.COMMENTS IMP SPEC: NORMAL
PATH REPORT.FINAL DX SPEC: NORMAL
PATH REPORT.GROSS SPEC: NORMAL
PATH REPORT.MICROSCOPIC SPEC OTHER STN: NORMAL
PATH REPORT.RELEVANT HX SPEC: NORMAL
PHOTO IMAGE: NORMAL

## 2023-08-05 ENCOUNTER — HEALTH MAINTENANCE LETTER (OUTPATIENT)
Age: 58
End: 2023-08-05

## 2023-11-01 ENCOUNTER — LAB REQUISITION (OUTPATIENT)
Dept: LAB | Facility: CLINIC | Age: 58
End: 2023-11-01

## 2023-11-01 DIAGNOSIS — Z01.818 ENCOUNTER FOR OTHER PREPROCEDURAL EXAMINATION: ICD-10-CM

## 2023-11-01 LAB
ERYTHROCYTE [DISTWIDTH] IN BLOOD BY AUTOMATED COUNT: 12.9 % (ref 10–15)
HCT VFR BLD AUTO: 38.4 % (ref 40–53)
HGB BLD-MCNC: 12.8 G/DL (ref 13.3–17.7)
MCH RBC QN AUTO: 33.2 PG (ref 26.5–33)
MCHC RBC AUTO-ENTMCNC: 33.3 G/DL (ref 31.5–36.5)
MCV RBC AUTO: 100 FL (ref 78–100)
PLATELET # BLD AUTO: 223 10E3/UL (ref 150–450)
RBC # BLD AUTO: 3.86 10E6/UL (ref 4.4–5.9)
WBC # BLD AUTO: 8 10E3/UL (ref 4–11)

## 2023-11-01 PROCEDURE — 85027 COMPLETE CBC AUTOMATED: CPT | Performed by: NURSE PRACTITIONER

## 2023-12-14 ENCOUNTER — HOSPITAL ENCOUNTER (OUTPATIENT)
Dept: ULTRASOUND IMAGING | Facility: HOSPITAL | Age: 58
Discharge: HOME | End: 2023-12-14
Attending: FAMILY MEDICINE | Admitting: FAMILY MEDICINE
Payer: COMMERCIAL

## 2023-12-14 DIAGNOSIS — M79.662 PAIN OF LEFT CALF: ICD-10-CM

## 2023-12-14 PROCEDURE — 93971 EXTREMITY STUDY: CPT | Mod: LT

## 2024-01-23 ENCOUNTER — LAB (OUTPATIENT)
Dept: LAB | Facility: HOSPITAL | Age: 59
End: 2024-01-23
Payer: COMMERCIAL

## 2024-01-23 DIAGNOSIS — H02.409 DROOPING EYELID: Primary | ICD-10-CM

## 2024-01-23 LAB
Lab: NORMAL
PERFORMING LABORATORY: NORMAL
SPECIMEN STATUS: NORMAL
TEST NAME: NORMAL

## 2024-01-23 PROCEDURE — 83516 IMMUNOASSAY NONANTIBODY: CPT

## 2024-01-23 PROCEDURE — 84999 UNLISTED CHEMISTRY PROCEDURE: CPT

## 2024-01-23 PROCEDURE — 83519 RIA NONANTIBODY: CPT

## 2024-01-23 PROCEDURE — 86366 MUSCLE-SPECIFIC KINASE ANTB: CPT

## 2024-01-23 PROCEDURE — 36415 COLL VENOUS BLD VENIPUNCTURE: CPT

## 2024-01-23 PROCEDURE — 83516 IMMUNOASSAY NONANTIBODY: CPT | Mod: XU

## 2024-01-25 LAB
ACHR BLOCK AB/ACHR TOTAL SFR SER: 10 %
ACHR MOD AB/ACHR TOTAL SFR SER: 0 %

## 2024-01-26 LAB — ACHR BIND AB SER-SCNC: 0.4 NMOL/L

## 2024-01-27 LAB — MISCELLANEOUS TEST 1 (ARUP): NORMAL

## 2024-06-24 ENCOUNTER — LAB REQUISITION (OUTPATIENT)
Dept: LAB | Facility: CLINIC | Age: 59
End: 2024-06-24

## 2024-06-24 DIAGNOSIS — Z01.818 ENCOUNTER FOR OTHER PREPROCEDURAL EXAMINATION: ICD-10-CM

## 2024-06-24 PROCEDURE — 80053 COMPREHEN METABOLIC PANEL: CPT | Performed by: PHYSICIAN ASSISTANT

## 2024-06-25 LAB
ALBUMIN SERPL BCG-MCNC: 4.5 G/DL (ref 3.5–5.2)
ALP SERPL-CCNC: 64 U/L (ref 40–150)
ALT SERPL W P-5'-P-CCNC: 16 U/L (ref 0–70)
ANION GAP SERPL CALCULATED.3IONS-SCNC: 10 MMOL/L (ref 7–15)
AST SERPL W P-5'-P-CCNC: 22 U/L (ref 0–45)
BILIRUB SERPL-MCNC: 0.3 MG/DL
BUN SERPL-MCNC: 10.1 MG/DL (ref 6–20)
CALCIUM SERPL-MCNC: 9.5 MG/DL (ref 8.6–10)
CHLORIDE SERPL-SCNC: 103 MMOL/L (ref 98–107)
CREAT SERPL-MCNC: 1.06 MG/DL (ref 0.67–1.17)
DEPRECATED HCO3 PLAS-SCNC: 24 MMOL/L (ref 22–29)
EGFRCR SERPLBLD CKD-EPI 2021: 82 ML/MIN/1.73M2
GLUCOSE SERPL-MCNC: 104 MG/DL (ref 70–99)
POTASSIUM SERPL-SCNC: 4.5 MMOL/L (ref 3.4–5.3)
PROT SERPL-MCNC: 8.1 G/DL (ref 6.4–8.3)
SODIUM SERPL-SCNC: 137 MMOL/L (ref 135–145)

## 2024-09-28 ENCOUNTER — HEALTH MAINTENANCE LETTER (OUTPATIENT)
Age: 59
End: 2024-09-28

## 2024-11-05 ENCOUNTER — HOSPITAL ENCOUNTER (INPATIENT)
Facility: HOSPITAL | Age: 59
LOS: 2 days | Discharge: HOME | End: 2024-11-07
Attending: EMERGENCY MEDICINE | Admitting: EMERGENCY MEDICINE
Payer: COMMERCIAL

## 2024-11-05 ENCOUNTER — APPOINTMENT (OUTPATIENT)
Dept: CT IMAGING | Facility: HOSPITAL | Age: 59
End: 2024-11-05
Attending: EMERGENCY MEDICINE
Payer: COMMERCIAL

## 2024-11-05 DIAGNOSIS — N17.9 AKI (ACUTE KIDNEY INJURY) (H): ICD-10-CM

## 2024-11-05 DIAGNOSIS — J18.9 PNEUMONIA OF RIGHT MIDDLE LOBE DUE TO INFECTIOUS ORGANISM: ICD-10-CM

## 2024-11-05 LAB
ALBUMIN UR-MCNC: 30 MG/DL
ANION GAP SERPL CALCULATED.3IONS-SCNC: 14 MMOL/L (ref 7–15)
APPEARANCE UR: CLEAR
BASOPHILS # BLD AUTO: 0 10E3/UL (ref 0–0.2)
BASOPHILS NFR BLD AUTO: 0 %
BILIRUB UR QL STRIP: NEGATIVE
BUN SERPL-MCNC: 16.9 MG/DL (ref 6–20)
CALCIUM SERPL-MCNC: 9.3 MG/DL (ref 8.8–10.4)
CHLORIDE SERPL-SCNC: 102 MMOL/L (ref 98–107)
COLOR UR AUTO: ABNORMAL
CREAT SERPL-MCNC: 1.91 MG/DL (ref 0.67–1.17)
CRP SERPL-MCNC: 17.4 MG/L
EGFRCR SERPLBLD CKD-EPI 2021: 40 ML/MIN/1.73M2
EOSINOPHIL # BLD AUTO: 0 10E3/UL (ref 0–0.7)
EOSINOPHIL NFR BLD AUTO: 0 %
ERYTHROCYTE [DISTWIDTH] IN BLOOD BY AUTOMATED COUNT: 12.8 % (ref 10–15)
GLUCOSE SERPL-MCNC: 118 MG/DL (ref 70–99)
GLUCOSE UR STRIP-MCNC: NEGATIVE MG/DL
HCO3 SERPL-SCNC: 21 MMOL/L (ref 22–29)
HCT VFR BLD AUTO: 38.1 % (ref 40–53)
HGB BLD-MCNC: 13.2 G/DL (ref 13.3–17.7)
HGB UR QL STRIP: ABNORMAL
IMM GRANULOCYTES # BLD: 0.1 10E3/UL
IMM GRANULOCYTES NFR BLD: 1 %
KETONES UR STRIP-MCNC: NEGATIVE MG/DL
LACTATE SERPL-SCNC: 1 MMOL/L (ref 0.7–2)
LEUKOCYTE ESTERASE UR QL STRIP: NEGATIVE
LYMPHOCYTES # BLD AUTO: 1.1 10E3/UL (ref 0.8–5.3)
LYMPHOCYTES NFR BLD AUTO: 6 %
MCH RBC QN AUTO: 34.4 PG (ref 26.5–33)
MCHC RBC AUTO-ENTMCNC: 34.6 G/DL (ref 31.5–36.5)
MCV RBC AUTO: 99 FL (ref 78–100)
MONOCYTES # BLD AUTO: 1.4 10E3/UL (ref 0–1.3)
MONOCYTES NFR BLD AUTO: 7 %
MUCOUS THREADS #/AREA URNS LPF: PRESENT /LPF
NEUTROPHILS # BLD AUTO: 16.9 10E3/UL (ref 1.6–8.3)
NEUTROPHILS NFR BLD AUTO: 86 %
NITRATE UR QL: NEGATIVE
NRBC # BLD AUTO: 0 10E3/UL
NRBC BLD AUTO-RTO: 0 /100
PH UR STRIP: 5.5 [PH] (ref 5–7)
PLATELET # BLD AUTO: 204 10E3/UL (ref 150–450)
POTASSIUM SERPL-SCNC: 4.3 MMOL/L (ref 3.4–5.3)
RBC # BLD AUTO: 3.84 10E6/UL (ref 4.4–5.9)
RBC URINE: 10 /HPF
SODIUM SERPL-SCNC: 137 MMOL/L (ref 135–145)
SP GR UR STRIP: 1.02 (ref 1–1.03)
UROBILINOGEN UR STRIP-MCNC: <2 MG/DL
WBC # BLD AUTO: 19.6 10E3/UL (ref 4–11)
WBC URINE: 3 /HPF

## 2024-11-05 PROCEDURE — 258N000003 HC RX IP 258 OP 636: Performed by: EMERGENCY MEDICINE

## 2024-11-05 PROCEDURE — 81001 URINALYSIS AUTO W/SCOPE: CPT | Performed by: EMERGENCY MEDICINE

## 2024-11-05 PROCEDURE — 36415 COLL VENOUS BLD VENIPUNCTURE: CPT | Performed by: EMERGENCY MEDICINE

## 2024-11-05 PROCEDURE — 99223 1ST HOSP IP/OBS HIGH 75: CPT | Performed by: EMERGENCY MEDICINE

## 2024-11-05 PROCEDURE — 250N000013 HC RX MED GY IP 250 OP 250 PS 637: Performed by: EMERGENCY MEDICINE

## 2024-11-05 PROCEDURE — 85004 AUTOMATED DIFF WBC COUNT: CPT | Performed by: EMERGENCY MEDICINE

## 2024-11-05 PROCEDURE — 120N000001 HC R&B MED SURG/OB

## 2024-11-05 PROCEDURE — 99285 EMERGENCY DEPT VISIT HI MDM: CPT | Mod: 25

## 2024-11-05 PROCEDURE — 87040 BLOOD CULTURE FOR BACTERIA: CPT | Performed by: EMERGENCY MEDICINE

## 2024-11-05 PROCEDURE — 83605 ASSAY OF LACTIC ACID: CPT | Performed by: EMERGENCY MEDICINE

## 2024-11-05 PROCEDURE — 80048 BASIC METABOLIC PNL TOTAL CA: CPT | Performed by: EMERGENCY MEDICINE

## 2024-11-05 PROCEDURE — 96374 THER/PROPH/DIAG INJ IV PUSH: CPT

## 2024-11-05 PROCEDURE — 250N000011 HC RX IP 250 OP 636: Performed by: EMERGENCY MEDICINE

## 2024-11-05 PROCEDURE — 74176 CT ABD & PELVIS W/O CONTRAST: CPT

## 2024-11-05 PROCEDURE — 86140 C-REACTIVE PROTEIN: CPT | Performed by: EMERGENCY MEDICINE

## 2024-11-05 RX ORDER — KETOROLAC TROMETHAMINE 15 MG/ML
15 INJECTION, SOLUTION INTRAMUSCULAR; INTRAVENOUS ONCE
Status: COMPLETED | OUTPATIENT
Start: 2024-11-05 | End: 2024-11-05

## 2024-11-05 RX ORDER — ONDANSETRON 4 MG/1
4 TABLET, ORALLY DISINTEGRATING ORAL EVERY 6 HOURS PRN
Status: DISCONTINUED | OUTPATIENT
Start: 2024-11-05 | End: 2024-11-07 | Stop reason: HOSPADM

## 2024-11-05 RX ORDER — NALOXONE HYDROCHLORIDE 0.4 MG/ML
0.2 INJECTION, SOLUTION INTRAMUSCULAR; INTRAVENOUS; SUBCUTANEOUS
Status: DISCONTINUED | OUTPATIENT
Start: 2024-11-05 | End: 2024-11-07 | Stop reason: HOSPADM

## 2024-11-05 RX ORDER — MUPIROCIN 20 MG/G
OINTMENT TOPICAL DAILY PRN
COMMUNITY

## 2024-11-05 RX ORDER — NALOXONE HYDROCHLORIDE 0.4 MG/ML
0.4 INJECTION, SOLUTION INTRAMUSCULAR; INTRAVENOUS; SUBCUTANEOUS
Status: DISCONTINUED | OUTPATIENT
Start: 2024-11-05 | End: 2024-11-07 | Stop reason: HOSPADM

## 2024-11-05 RX ORDER — CEFTRIAXONE 1 G/1
1 INJECTION, POWDER, FOR SOLUTION INTRAMUSCULAR; INTRAVENOUS EVERY 24 HOURS
Status: DISCONTINUED | OUTPATIENT
Start: 2024-11-06 | End: 2024-11-07 | Stop reason: HOSPADM

## 2024-11-05 RX ORDER — AMOXICILLIN 250 MG
1 CAPSULE ORAL 2 TIMES DAILY PRN
Status: DISCONTINUED | OUTPATIENT
Start: 2024-11-05 | End: 2024-11-07 | Stop reason: HOSPADM

## 2024-11-05 RX ORDER — HYDROCORTISONE 25 MG/G
CREAM TOPICAL DAILY PRN
COMMUNITY

## 2024-11-05 RX ORDER — LIDOCAINE 40 MG/G
CREAM TOPICAL
Status: DISCONTINUED | OUTPATIENT
Start: 2024-11-05 | End: 2024-11-07 | Stop reason: HOSPADM

## 2024-11-05 RX ORDER — ACETAMINOPHEN 325 MG/1
650 TABLET ORAL EVERY 4 HOURS PRN
Status: DISCONTINUED | OUTPATIENT
Start: 2024-11-05 | End: 2024-11-07 | Stop reason: HOSPADM

## 2024-11-05 RX ORDER — CALCIUM CARBONATE 500 MG/1
1000 TABLET, CHEWABLE ORAL 4 TIMES DAILY PRN
Status: DISCONTINUED | OUTPATIENT
Start: 2024-11-05 | End: 2024-11-07 | Stop reason: HOSPADM

## 2024-11-05 RX ORDER — MUPIROCIN 20 MG/G
OINTMENT TOPICAL DAILY PRN
Status: DISCONTINUED | OUTPATIENT
Start: 2024-11-05 | End: 2024-11-07 | Stop reason: HOSPADM

## 2024-11-05 RX ORDER — ONDANSETRON 2 MG/ML
4 INJECTION INTRAMUSCULAR; INTRAVENOUS EVERY 6 HOURS PRN
Status: DISCONTINUED | OUTPATIENT
Start: 2024-11-05 | End: 2024-11-07 | Stop reason: HOSPADM

## 2024-11-05 RX ORDER — ACETAMINOPHEN 325 MG/1
650 TABLET ORAL ONCE
Status: COMPLETED | OUTPATIENT
Start: 2024-11-05 | End: 2024-11-05

## 2024-11-05 RX ORDER — AMOXICILLIN 250 MG
2 CAPSULE ORAL 2 TIMES DAILY PRN
Status: DISCONTINUED | OUTPATIENT
Start: 2024-11-05 | End: 2024-11-07 | Stop reason: HOSPADM

## 2024-11-05 RX ORDER — HYDROCORTISONE 25 MG/G
CREAM TOPICAL DAILY PRN
Status: DISCONTINUED | OUTPATIENT
Start: 2024-11-05 | End: 2024-11-07 | Stop reason: HOSPADM

## 2024-11-05 RX ORDER — CEFTRIAXONE 1 G/1
1 INJECTION, POWDER, FOR SOLUTION INTRAMUSCULAR; INTRAVENOUS ONCE
Status: COMPLETED | OUTPATIENT
Start: 2024-11-05 | End: 2024-11-05

## 2024-11-05 RX ORDER — SODIUM CHLORIDE 9 MG/ML
INJECTION, SOLUTION INTRAVENOUS CONTINUOUS
Status: ACTIVE | OUTPATIENT
Start: 2024-11-05 | End: 2024-11-06

## 2024-11-05 RX ORDER — ACETAMINOPHEN 650 MG/1
650 SUPPOSITORY RECTAL EVERY 4 HOURS PRN
Status: DISCONTINUED | OUTPATIENT
Start: 2024-11-05 | End: 2024-11-07 | Stop reason: HOSPADM

## 2024-11-05 RX ADMIN — SODIUM CHLORIDE: 9 INJECTION, SOLUTION INTRAVENOUS at 20:37

## 2024-11-05 RX ADMIN — KETOROLAC TROMETHAMINE 15 MG: 15 INJECTION, SOLUTION INTRAMUSCULAR; INTRAVENOUS at 07:39

## 2024-11-05 RX ADMIN — Medication 50 MG: at 07:44

## 2024-11-05 RX ADMIN — AZITHROMYCIN MONOHYDRATE 500 MG: 500 INJECTION, POWDER, LYOPHILIZED, FOR SOLUTION INTRAVENOUS at 10:17

## 2024-11-05 RX ADMIN — CEFTRIAXONE SODIUM 1 G: 1 INJECTION, POWDER, FOR SOLUTION INTRAMUSCULAR; INTRAVENOUS at 09:45

## 2024-11-05 RX ADMIN — ACETAMINOPHEN 650 MG: 325 TABLET ORAL at 07:44

## 2024-11-05 ASSESSMENT — ACTIVITIES OF DAILY LIVING (ADL)
ADLS_ACUITY_SCORE: 0

## 2024-11-05 ASSESSMENT — COLUMBIA-SUICIDE SEVERITY RATING SCALE - C-SSRS
1. IN THE PAST MONTH, HAVE YOU WISHED YOU WERE DEAD OR WISHED YOU COULD GO TO SLEEP AND NOT WAKE UP?: NO
6. HAVE YOU EVER DONE ANYTHING, STARTED TO DO ANYTHING, OR PREPARED TO DO ANYTHING TO END YOUR LIFE?: NO
2. HAVE YOU ACTUALLY HAD ANY THOUGHTS OF KILLING YOURSELF IN THE PAST MONTH?: NO

## 2024-11-05 NOTE — ED NOTES
[facility] ED Handoff Report    ED Chief Complaint: Flank pain    ED Diagnosis:  (J18.9) Pneumonia of right middle lobe due to infectious organism  Comment:   Plan: admit    (N17.9) SCOTT (acute kidney injury) (H)  Comment:   Plan:        PMH:  No past medical history on file.     Code Status:  Full Code     Falls Risk: Yes Band: Applied    Current Living Situation/Residence: lives alone     Elimination Status: Continent: Yes     Activity Level: Independent    Patients Preferred Language:  English     Needed: No    Vital Signs:  /57   Pulse 91   Temp 98.7  F (37.1  C) (Oral)   Resp 20   Ht 1.829 m (6')   Wt 64.4 kg (142 lb)   SpO2 97%   BMI 19.26 kg/m       Cardiac Rhythm: na    Pain Score: 1/10    Is the Patient Confused:  No    Last Food or Drink: 11/4/24 at     Focused Assessment:  Pt with right flank pain and cough.  Pt has hx of kidney stones.  Stones removed from left side but now having problems with right.    Tests Performed: Done: Labs and Imaging    Treatments Provided:  Iv abx    Family Dynamics/Concerns: No    Family Updated On Visitor Policy: No    Plan of Care Communicated to Family: No    Who Was Updated about Plan of Care:      Belongings Checklist Done and Signed by Patient: Yes    Belongings Sent with Patient: phone, clothing glasses wallet.    Medications sent with patient:         Additional Information:     RN: Ruthy Mcqueen RN   11/5/2024 2:38 PM     f

## 2024-11-05 NOTE — PHARMACY-ADMISSION MEDICATION HISTORY
Pharmacist Admission Medication History    Admission medication history is complete. The information provided in this note is only as accurate as the sources available at the time of the update.    Information Source(s): Patient via in-person    Pertinent Information: Patient last Remicade injection 10/21/24. Patient has not had any home medications today 11/5/24.    Changes made to PTA medication list:  Added: Hydrocortisone, Mupirocin   Deleted: APAP, Ibuprofen, Ondansetron   Changed: None    Allergies reviewed with patient and updates made in EHR: yes    Medication History Completed By: TWAN HUYNH RPH 11/5/2024 9:27 AM    PTA Med List   Medication Sig Last Dose/Taking    aspirin-acetaminophen-caffeine (EXCEDRIN MIGRAINE) 250-250-65 mg per tablet [ASPIRIN-ACETAMINOPHEN-CAFFEINE (EXCEDRIN MIGRAINE) 250-250-65 MG PER TABLET] Take 2 tablets by mouth every 6 (six) hours as needed for pain. Past Month    hydrocortisone 2.5 % cream Apply topically daily as needed for rash. 11/4/2024 Morning    mupirocin (BACTROBAN) 2 % external ointment Apply topically daily as needed. 11/4/2024 Morning

## 2024-11-05 NOTE — ED TRIAGE NOTES
"Pt. Stated that he has a history of large amounts of kidney stones. Has had stents placed in the left side and stones were \"blasted\" but he has not had the right side done yet. Yesterday he began having sever pain in his right flank that radiates to his abdomen. Still able to urinate, no blood noted, stated that \"it's getting dark.\" Has not taken anything for pain.      Triage Assessment (Adult)       Row Name 11/05/24 0707          Triage Assessment    Airway WDL WDL        Respiratory WDL    Respiratory WDL WDL        Skin Circulation/Temperature WDL    Skin Circulation/Temperature WDL WDL        Cardiac WDL    Cardiac WDL WDL        Peripheral/Neurovascular WDL    Peripheral Neurovascular WDL WDL        Cognitive/Neuro/Behavioral WDL    Cognitive/Neuro/Behavioral WDL WDL                     "

## 2024-11-05 NOTE — ED PROVIDER NOTES
EMERGENCY DEPARTMENT ENCOUNTER     NAME: Denys García   AGE: 59 year old male   YOB: 1965   MRN: [id number]   EVALUATION DATE & TIME: 11/5/2024  7:10 AM   PCP: Susan Jacobson     Chief Complaint   Patient presents with    Flank Pain     Stated that he has massive kidney stones   :    FINAL IMPRESSION       1. Pneumonia of right middle lobe due to infectious organism    2. SCOTT (acute kidney injury) (H)           ED COURSE & MEDICAL DECISION MAKING      Pertinent Labs & Imaging studies reviewed. (See chart for details)   59 year old male  presents to the Emergency Department for evaluation of right-sided flank pain. Initial Vitals Reviewed. Initial exam notable for patient who is afebrile with a soft but normal blood pressure, uncomfortable appearing with some right CVA tenderness.  He does have a history of ureteral stones and so kidney stone is highest on the list, but also considered musculoskeletal pain, other intra-abdominal pathology, AAA.  Labs show a pretty significant leukocytosis of greater than 19 and his creatinine which just a few months ago was normal at 1.0 is 1.91.  Urinalysis has trace hematuria but no signs of infection.  CT scan of the abdomen and pelvis interestingly only shows the kidney stones that are up in his kidneys and were previously known, but no obstruction or ureteral stone and I do not think this is what is causing his pain.  What it actually found is what looks like a right sided pneumonia and in the setting of acute kidney injury, significant leukocytosis, pain, and this CT finding I think it is most reasonable to start some IV antibiotics along with treating him symptomatically for pain and admit to the hospital to watch for improvement of labs.  Case discussed with hospitalist Dr. Candelaria who accepted the patient for admission.           At the conclusion of the encounter I discussed the results of all of the tests and the disposition. The questions were  answered. The patient or family acknowledged understanding and was agreeable with the care plan.     0 minutes critical care time, see procedure note below for details if relevant    Medical Decision Making    History:  Supplemental history from: N/A  External Record(s) reviewed: Prior Imaging: [facility] Ultrasound 10/28/24    Work Up:  Chart documentation includes differential considered and any EKGs or imaging independently interpreted by provider, where specified.  In additional to work up documented, I considered the following work up: Documented in chart, if applicable.    External consultation:  Discussion of management with another provider: Hospitalist    Complicating factors:  Care impacted by chronic illness: Other: Calculus of kidney, Crohn's disease, Esophagitis   Care affected by social determinants of health: Access to Medical Care    Disposition considerations: Admit.    Not Applicable            MEDICATIONS GIVEN IN THE EMERGENCY:   Medications   ketorolac (TORADOL) injection 15 mg (has no administration in time range)   acetaminophen (TYLENOL) tablet 650 mg (has no administration in time range)   dimenhyDRINATE chew tab 50 mg (has no administration in time range)      NEW PRESCRIPTIONS STARTED AT TODAY'S ER VISIT   New Prescriptions    No medications on file     ================================================================   HISTORY OF PRESENT ILLNESS       Patient information was obtained from: the patient   Use of Intrepreter: N/A   Denys García is a 59 year old male with history of Calculus of kidney, Crohn's disease, Esophagitis who presents right flank pain.    Patient states the right flank pain started at 9:45 PM last night. He doesn't have any pain medication at home. The pain has been slowly worsening and starting to wrap around his right side. Endorses shortness of breath due to the pain. He had an ultrasound a few weeks ago showing he had kidney stones. He is  currently able to urinate. He has a history of kidney stones and recently needed intervention to help remove stones on the left.    He denies nausea, vomiting.    ================================================================        PAST HISTORY     PAST MEDICAL HISTORY:   No past medical history on file.   PAST SURGICAL HISTORY:   Past Surgical History:   Procedure Laterality Date    APPENDECTOMY      IR AORTIC ARCH 4 VESSEL ANGIOGRAM  4/14/2008    IR MISCELLANEOUS PROCEDURE  4/14/2008    IR MISCELLANEOUS PROCEDURE  4/14/2008    IR MISCELLANEOUS PROCEDURE  4/14/2008    SMALL INTESTINE SURGERY      partial small bowel resection for crohns      CURRENT MEDICATIONS:   acetaminophen (TYLENOL) 500 MG tablet  aspirin-acetaminophen-caffeine (EXCEDRIN MIGRAINE) 250-250-65 mg per tablet  ibuprofen (ADVIL/MOTRIN) 600 MG tablet  inFLIXimab (REMICADE) 100 mg injection  ondansetron (ZOFRAN ODT) 4 MG ODT tab      ALLERGIES:   Allergies   Allergen Reactions    Contrast Dye Rash     Itchy about five years ago  Itchy about five years ago      Iodinated Contrast Media [Iodinated Contrast Media] Rash     Itchy about five years ago      FAMILY HISTORY:   No family history on file.   SOCIAL HISTORY:   Social History     Socioeconomic History    Marital status:    Substance and Sexual Activity    Sexual activity: Not Currently        VITALS  Patient Vitals for the past 24 hrs:   BP Temp Temp src Pulse Resp SpO2 Height Weight   11/05/24 0705 115/61 98.6  F (37  C) Oral 103 20 96 % 1.829 m (6') 64.4 kg (142 lb)        ================================================================    PHYSICAL EXAM     VITAL SIGNS: /61   Pulse 103   Temp 98.6  F (37  C) (Oral)   Resp 20   Ht 1.829 m (6')   Wt 64.4 kg (142 lb)   SpO2 96%   BMI 19.26 kg/m     Constitutional:  Awake, no acute distress   HENT:  Atraumatic, oropharynx without exudate or erythema, membranes moist  Lymph:  No adenopathy  Eyes: EOM intact, PERRL, no  injection  Neck: Supple  Respiratory:  Clear to auscultation bilaterally, no wheezes or crackles   Cardiovascular:  Regular rate and rhythm, single S1 and S2   GI:  Soft, nontender, nondistended, no rebound or guarding   Musculoskeletal:  Moves all extremities, no lower extremity edema, no deformities    Skin:  Warm, dry  Neurologic:  Alert and oriented x3, no focal deficits noted       ================================================================  LAB       All pertinent labs reviewed and interpreted.   Labs Ordered and Resulted from Time of ED Arrival to Time of ED Departure   BASIC METABOLIC PANEL - Abnormal       Result Value    Sodium 137      Potassium 4.3      Chloride 102      Carbon Dioxide (CO2) 21 (*)     Anion Gap 14      Urea Nitrogen 16.9      Creatinine 1.91 (*)     GFR Estimate 40 (*)     Calcium 9.3      Glucose 118 (*)    CRP INFLAMMATION - Abnormal    CRP Inflammation 17.40 (*)    ROUTINE UA WITH MICROSCOPIC REFLEX TO CULTURE - Abnormal    Color Urine Orange (*)     Appearance Urine Clear      Glucose Urine Negative      Bilirubin Urine Negative      Ketones Urine Negative      Specific Gravity Urine 1.023      Blood Urine 0.03 mg/dL (*)     pH Urine 5.5      Protein Albumin Urine 30 (*)     Urobilinogen Urine <2.0      Nitrite Urine Negative      Leukocyte Esterase Urine Negative      Mucus Urine Present (*)     RBC Urine 10 (*)     WBC Urine 3     CBC WITH PLATELETS AND DIFFERENTIAL - Abnormal    WBC Count 19.6 (*)     RBC Count 3.84 (*)     Hemoglobin 13.2 (*)     Hematocrit 38.1 (*)     MCV 99      MCH 34.4 (*)     MCHC 34.6      RDW 12.8      Platelet Count 204      % Neutrophils 86      % Lymphocytes 6      % Monocytes 7      % Eosinophils 0      % Basophils 0      % Immature Granulocytes 1      NRBCs per 100 WBC 0      Absolute Neutrophils 16.9 (*)     Absolute Lymphocytes 1.1      Absolute Monocytes 1.4 (*)     Absolute Eosinophils 0.0      Absolute Basophils 0.0      Absolute Immature  Granulocytes 0.1      Absolute NRBCs 0.0     LACTIC ACID WHOLE BLOOD WITH 1X REPEAT IN 2 HR WHEN >2   BLOOD CULTURE        ===============================================================  RADIOLOGY       Reviewed all pertinent imaging. Please see official radiology report.   CT Abdomen Pelvis w/o Contrast   Final Result   IMPRESSION:       1.  Multiple nonobstructing right renal calculi are present largest of which is 5 mm in the lower pole. No urinary tract obstruction.   2.  Airway centric groundglass inflammatory nodules in the right middle lobe consistent with pneumonia.   3.  Stable benign right adrenal adenoma. No imaging workup or follow-up is necessary.               ================================================================  EKG         I have independently reviewed and interpreted the EKG(s) documented above.     ================================================================  PROCEDURES         I, Amanda Rausch, am serving as a scribe to document services personally performed by Dr. Chery based on my observation and the provider's statements to me. I, Jes Chery MD attest that Amanda Rausch is acting in a scribe capacity, has observed my performance of the services and has documented them in accordance with my direction.     Jes Chery M.D.   Emergency Medicine   [facility] EMERGENCY DEPARTMENT  [address] [place] [id number]  [phone]  Dept: [phone]      Jes Chery MD  11/05/24 0941

## 2024-11-05 NOTE — H&P
ADMISSION HISTORY & PHYSICAL      Susan Jacobson, [phone]  ASSESSMENT AND PLAN:  59 year old male with a history of chronic kidney disease, Crohn's disease and kidney stones admitted with pneumonia.    1.  Community-acquired pneumonia: CT scan showed airway centric ground glass inflammation in the right middle lobe consistent with pneumonia.  Started on IV Rocephin and Zithromax.  White count elevated at 19.6.  2.  SCOTT: Creatinine 1.91 with most recent baseline around 1.0.  IV fluids and trend  3.  Kidney stones: He has known multiple nonobstructing right renal calculi largest of which is 5 mm in the lower pole.  No evidence of obstruction  Clinically Significant Risk Factors Present on Admission                                         Barriers to discharge: Pneumonia, fatigue      CHIEF COMPLAINT:  Right flank pain, cough, fatigue    HISTORY OF PRESENTING ILLNESS:  Denys García is a 59 year old male presented with the above symptoms which he felt were secondary to his known kidney stones.  He was found to have pneumonia.  He also has a harsh productive cough and clinically appears quite weak and tired.  He has had no fevers or chills.  No chest pain, no dyspnea at rest.  No nausea vomiting or diarrhea.    PMH/PSH:  Patient Active Problem List   Diagnosis    Concussion, unspecified    Vertigo    Concussion syndrome    Crohn's disease of small and large intestines (H)    Diarrhea    Disorder of stomach    Dysphagia    Esophagitis    Regional enteritis (H)    Cobalamin deficiency    Vitamin B deficiency    Vitamin D deficiency    SCOTT (acute kidney injury) (H)    Pneumonia of right middle lobe due to infectious organism       ALLERGIES:  Allergies   Allergen Reactions    Contrast Dye Rash     Itchy about five years ago  Itchy about five years ago      Iodinated Contrast Media [Iodinated Contrast Media] Rash     Itchy about five years ago       MEDICATIONS:  Reviewed.  No current facility-administered  medications for this encounter.     Current Outpatient Medications   Medication Sig Dispense Refill    aspirin-acetaminophen-caffeine (EXCEDRIN MIGRAINE) 250-250-65 mg per tablet [ASPIRIN-ACETAMINOPHEN-CAFFEINE (EXCEDRIN MIGRAINE) 250-250-65 MG PER TABLET] Take 2 tablets by mouth every 6 (six) hours as needed for pain.      hydrocortisone 2.5 % cream Apply topically daily as needed for rash.      mupirocin (BACTROBAN) 2 % external ointment Apply topically daily as needed.      inFLIXimab (REMICADE) 100 mg injection [INFLIXIMAB (REMICADE) 100 MG INJECTION] Infuse into a venous catheter. Get injection every eight weeks at the clinic         SOCIAL HISTORY:  Social History     Socioeconomic History    Marital status:      Spouse name: Not on file    Number of children: Not on file    Years of education: Not on file    Highest education level: Not on file   Occupational History    Not on file   Tobacco Use    Smoking status: Not on file    Smokeless tobacco: Not on file   Substance and Sexual Activity    Alcohol use: Not on file    Drug use: Not on file    Sexual activity: Not Currently   Other Topics Concern    Not on file   Social History Narrative    Not on file     Social Drivers of Health     Financial Resource Strain: Not on file   Food Insecurity: Not on file   Transportation Needs: Not on file   Physical Activity: Not on file   Stress: Not on file   Social Connections: Not on file   Interpersonal Safety: Not on file   Housing Stability: Not on file       FAMILY HISTORY:  No family history on file.    ROS:  12 point ROS was performed and found to be negative except for the pertinent positives mentioned in the HPI.      PHYSICAL EXAM:  /59   Pulse 91   Temp 98.7  F (37.1  C) (Oral)   Resp 20   Ht 1.829 m (6')   Wt 64.4 kg (142 lb)   SpO2 99%   BMI 19.26 kg/m    No intake/output data recorded.  No intake/output data recorded.  CONSTITUTIONAL: No apparent distress but tired appearing  HEENT:        Sclera- anicteric      Mucous membrane-dry  LUNGS: Coarse breath sounds on the right  CARDIOVASCULAR: S1S2 regular. No murmurs, rubs or gallops,no pedal edema  GI: Soft. Non-tender, non-distended. No organomegaly. No guarding or rigidity. Bowel sounds- active  NEURO: Cranial nerves II-XII grossly intact. No focal neurological deficit. No involuntary movements  INTGM: No pallor, no cyanosis or clubbing  LYMPH:   MSK: no joint swelling or tenderness  PSYCH: alert and oriented x 3, no agitation      DIAGNOSTIC DATA:  Recent Results (from the past 24 hours)   Basic metabolic panel    Collection Time: 11/05/24  7:38 AM   Result Value Ref Range    Sodium 137 135 - 145 mmol/L    Potassium 4.3 3.4 - 5.3 mmol/L    Chloride 102 98 - 107 mmol/L    Carbon Dioxide (CO2) 21 (L) 22 - 29 mmol/L    Anion Gap 14 7 - 15 mmol/L    Urea Nitrogen 16.9 6.0 - 20.0 mg/dL    Creatinine 1.91 (H) 0.67 - 1.17 mg/dL    GFR Estimate 40 (L) >60 mL/min/1.73m2    Calcium 9.3 8.8 - 10.4 mg/dL    Glucose 118 (H) 70 - 99 mg/dL   CRP inflammation    Collection Time: 11/05/24  7:38 AM   Result Value Ref Range    CRP Inflammation 17.40 (H) <5.00 mg/L   CBC with platelets and differential    Collection Time: 11/05/24  7:38 AM   Result Value Ref Range    WBC Count 19.6 (H) 4.0 - 11.0 10e3/uL    RBC Count 3.84 (L) 4.40 - 5.90 10e6/uL    Hemoglobin 13.2 (L) 13.3 - 17.7 g/dL    Hematocrit 38.1 (L) 40.0 - 53.0 %    MCV 99 78 - 100 fL    MCH 34.4 (H) 26.5 - 33.0 pg    MCHC 34.6 31.5 - 36.5 g/dL    RDW 12.8 10.0 - 15.0 %    Platelet Count 204 150 - 450 10e3/uL    % Neutrophils 86 %    % Lymphocytes 6 %    % Monocytes 7 %    % Eosinophils 0 %    % Basophils 0 %    % Immature Granulocytes 1 %    NRBCs per 100 WBC 0 <1 /100    Absolute Neutrophils 16.9 (H) 1.6 - 8.3 10e3/uL    Absolute Lymphocytes 1.1 0.8 - 5.3 10e3/uL    Absolute Monocytes 1.4 (H) 0.0 - 1.3 10e3/uL    Absolute Eosinophils 0.0 0.0 - 0.7 10e3/uL    Absolute Basophils 0.0 0.0 - 0.2 10e3/uL     Absolute Immature Granulocytes 0.1 <=0.4 10e3/uL    Absolute NRBCs 0.0 10e3/uL   UA with Microscopic reflex to Culture    Collection Time: 11/05/24  8:43 AM    Specimen: Urine, Midstream   Result Value Ref Range    Color Urine Orange (A) Colorless, Straw, Light Yellow, Yellow    Appearance Urine Clear Clear    Glucose Urine Negative Negative mg/dL    Bilirubin Urine Negative Negative    Ketones Urine Negative Negative mg/dL    Specific Gravity Urine 1.023 1.001 - 1.030    Blood Urine 0.03 mg/dL (A) Negative    pH Urine 5.5 5.0 - 7.0    Protein Albumin Urine 30 (A) Negative mg/dL    Urobilinogen Urine <2.0 <2.0 mg/dL    Nitrite Urine Negative Negative    Leukocyte Esterase Urine Negative Negative    Mucus Urine Present (A) None Seen /LPF    RBC Urine 10 (H) <=2 /HPF    WBC Urine 3 <=5 /HPF   Lactic Acid Whole Blood with 1X Repeat in 2 HR when >2    Collection Time: 11/05/24  9:41 AM   Result Value Ref Range    Lactic Acid, Initial 1.0 0.7 - 2.0 mmol/L     All lab studies reviewed personally    CT Abdomen Pelvis w/o Contrast    Result Date: 11/5/2024  EXAM: CT ABDOMEN PELVIS W/O CONTRAST LOCATION: [facility] DATE: 11/5/2024 INDICATION: flank pain COMPARISON: CT of the abdomen and pelvis 7/8/2024 TECHNIQUE: CT scan of the abdomen and pelvis was performed without IV contrast. Multiplanar reformats were obtained. Dose reduction techniques were used. CONTRAST: None. FINDINGS: LOWER CHEST: Airway centric/centrilobular groundglass attenuation nodules are present in the imaged right middle lobe consistent with pneumonia. Atelectasis is present in both posterior costophrenic sulci. HEPATOBILIARY: Stable 11 mm benign cyst in the anterior left lobe of the liver. No new liver lesions. Smooth liver capsule. Gallbladder and bile ducts are normal. PANCREAS: Normal. SPLEEN: Normal. ADRENAL GLANDS: Stable 18 x 20 mm right adrenal nodule consistent with a benign adenoma. Left adrenal gland is normal.  KIDNEYS/BLADDER: Multiple calcifications are present at the cortical medullary junction of the right kidney in both the upper and lower pole. The largest calculus is in an anterior lower pole calyx measuring 5 mm (series 3, image 86). Large majority of radiopaque calculi present in the left kidney 7/8/2024 have resolved. There is a solitary remaining punctate radiopaque calculus in a left upper pole calyx. No hydronephrosis or hydroureter. Urinary bladder is within normal limits. BOWEL: Nondistended stomach and duodenum. Small bowel is normal in contour and caliber. There is an enterocolonic anastomosis in the right lower quadrant. Appendix is absent. No bowel distention or wall thickening. No inflammatory stranding in the mesentery or peritoneal thickening. LYMPH NODES: There are no new enlarged lymph nodes in the abdomen or pelvis. VASCULATURE: Unchanged atheromatous calcifications of the infrarenal abdominal aorta. No aneurysm. PELVIC ORGANS: Prostate gland is normal in size. Seminal vesicles are symmetric. No pelvic free fluid. MUSCULOSKELETAL: Accentuated lumbar lordosis. Generalized bone demineralization. Low lumbar facet arthropathy.     IMPRESSION: 1.  Multiple nonobstructing right renal calculi are present largest of which is 5 mm in the lower pole. No urinary tract obstruction. 2.  Airway centric groundglass inflammatory nodules in the right middle lobe consistent with pneumonia. 3.  Stable benign right adrenal adenoma. No imaging workup or follow-up is necessary.     US Renal-bladder    Result Date: 10/28/2024  EXAM: US RENAL W BLADDER LOCATION:  [facility] DATE: 10/28/2024 INDICATION: Left stent removal, Calculus of kidney, TENDERNESS COMPARISON: CT abdomen/pelvis 7/8/2024. TECHNIQUE: Routine Bilateral Renal and Bladder Ultrasound. FINDINGS:  RIGHT KIDNEY: 9.5 cm. No hydronephrosis. Multiple shadowing renal calculi measuring up to 0.6 cm in the lower pole. LEFT KIDNEY: 10.8 cm. No hydronephrosis.  Few shadowing renal calculi measuring up to 0.3 cm in the lower pole. BLADDER: Bilateral ureteral jets are seen. There are tiny mobile internal echoes within the urinary bladder. IMPRESSION: 1.  No hydronephrosis. 2.  Nonobstructing bilateral renal calculi. 3.  Bladder debris. Correlate with urinalysis.    Radiology report reviewed.    Medically Ready for Discharge: Anticipated in 2-4 Days       Medical Decision Making       75 MINUTES SPENT BY ME on the date of service doing chart review, history, exam, documentation & further activities per the note.      Jaiml Candelaria MD  [facility] Medicine Service

## 2024-11-05 NOTE — LETTER
[place] [id number] [facility] [address]  Phone: [phone]  Fax: [phone]    November 7, 2024        Denys García  [address] [place] [address]          To whom it may concern:    RE: Denys García    Patient was seen and treated 11/5/2024-11/6/2024 at our hospital  and missed work. May return to work 11/11/2024 without restrictions.     Please contact me for questions or concerns.      Sincerely,          Viky Johnston DO

## 2024-11-06 LAB
ALBUMIN SERPL BCG-MCNC: 3.7 G/DL (ref 3.5–5.2)
ALP SERPL-CCNC: 48 U/L (ref 40–150)
ALT SERPL W P-5'-P-CCNC: 12 U/L (ref 0–70)
ANION GAP SERPL CALCULATED.3IONS-SCNC: 11 MMOL/L (ref 7–15)
AST SERPL W P-5'-P-CCNC: 23 U/L (ref 0–45)
BILIRUB SERPL-MCNC: 0.8 MG/DL
BUN SERPL-MCNC: 21.3 MG/DL (ref 6–20)
CALCIUM SERPL-MCNC: 8.3 MG/DL (ref 8.8–10.4)
CHLORIDE SERPL-SCNC: 106 MMOL/L (ref 98–107)
CREAT SERPL-MCNC: 1.39 MG/DL (ref 0.67–1.17)
EGFRCR SERPLBLD CKD-EPI 2021: 59 ML/MIN/1.73M2
ERYTHROCYTE [DISTWIDTH] IN BLOOD BY AUTOMATED COUNT: 12.7 % (ref 10–15)
GLUCOSE SERPL-MCNC: 93 MG/DL (ref 70–99)
HCO3 SERPL-SCNC: 20 MMOL/L (ref 22–29)
HCT VFR BLD AUTO: 34.2 % (ref 40–53)
HGB BLD-MCNC: 11.4 G/DL (ref 13.3–17.7)
MCH RBC QN AUTO: 32.1 PG (ref 26.5–33)
MCHC RBC AUTO-ENTMCNC: 33.3 G/DL (ref 31.5–36.5)
MCV RBC AUTO: 96 FL (ref 78–100)
PLATELET # BLD AUTO: 161 10E3/UL (ref 150–450)
POTASSIUM SERPL-SCNC: 4 MMOL/L (ref 3.4–5.3)
PROT SERPL-MCNC: 6.8 G/DL (ref 6.4–8.3)
RBC # BLD AUTO: 3.55 10E6/UL (ref 4.4–5.9)
SODIUM SERPL-SCNC: 137 MMOL/L (ref 135–145)
WBC # BLD AUTO: 11.6 10E3/UL (ref 4–11)

## 2024-11-06 PROCEDURE — 80053 COMPREHEN METABOLIC PANEL: CPT | Performed by: EMERGENCY MEDICINE

## 2024-11-06 PROCEDURE — 99233 SBSQ HOSP IP/OBS HIGH 50: CPT | Performed by: INTERNAL MEDICINE

## 2024-11-06 PROCEDURE — 85027 COMPLETE CBC AUTOMATED: CPT | Performed by: EMERGENCY MEDICINE

## 2024-11-06 PROCEDURE — 36415 COLL VENOUS BLD VENIPUNCTURE: CPT | Performed by: EMERGENCY MEDICINE

## 2024-11-06 PROCEDURE — 120N000001 HC R&B MED SURG/OB

## 2024-11-06 PROCEDURE — 250N000013 HC RX MED GY IP 250 OP 250 PS 637: Performed by: HOSPITALIST

## 2024-11-06 PROCEDURE — 250N000013 HC RX MED GY IP 250 OP 250 PS 637: Performed by: EMERGENCY MEDICINE

## 2024-11-06 PROCEDURE — 258N000003 HC RX IP 258 OP 636: Performed by: EMERGENCY MEDICINE

## 2024-11-06 PROCEDURE — 250N000011 HC RX IP 250 OP 636: Performed by: EMERGENCY MEDICINE

## 2024-11-06 RX ORDER — GUAIFENESIN 200 MG/10ML
200 LIQUID ORAL EVERY 4 HOURS PRN
Status: DISCONTINUED | OUTPATIENT
Start: 2024-11-06 | End: 2024-11-07 | Stop reason: HOSPADM

## 2024-11-06 RX ADMIN — Medication 3 MG: at 20:56

## 2024-11-06 RX ADMIN — Medication 3 MG: at 03:53

## 2024-11-06 RX ADMIN — AZITHROMYCIN MONOHYDRATE 500 MG: 500 INJECTION, POWDER, LYOPHILIZED, FOR SOLUTION INTRAVENOUS at 10:07

## 2024-11-06 RX ADMIN — CEFTRIAXONE SODIUM 1 G: 1 INJECTION, POWDER, FOR SOLUTION INTRAMUSCULAR; INTRAVENOUS at 09:15

## 2024-11-06 RX ADMIN — ACETAMINOPHEN 650 MG: 325 TABLET ORAL at 03:53

## 2024-11-06 RX ADMIN — GUAIFENESIN 200 MG: 200 SOLUTION ORAL at 03:53

## 2024-11-06 ASSESSMENT — ACTIVITIES OF DAILY LIVING (ADL)
ADLS_ACUITY_SCORE: 0
DEPENDENT_IADLS:: INDEPENDENT
ADLS_ACUITY_SCORE: 0

## 2024-11-06 NOTE — PROGRESS NOTES
[facility]    Medicine Progress Note - Hospitalist Service    Date of Admission:  11/5/2024    Assessment & Plan   Denys García is a 59 year old male with a history of chronic kidney disease, Crohn's disease and kidney stones admitted with pneumonia.     1.  Community-acquired pneumonia: CT scan showed airway centric ground glass inflammation in the right middle lobe consistent with pneumonia.  Started on IV Rocephin and Zithromax.  White count elevated at 19.6.  Not requiring supplementary oxygen.  Likely switch to oral antibiotics 11/7.  2.  SCOTT: Creatinine on admission 1.91 with most recent baseline around 1.0.  Continue IV fluids for now.  Has decreased to 1.39 today, will continue to monitor  3.  Kidney stones: He has known multiple nonobstructing right renal calculi largest of which is 5 mm in the lower pole.  No evidence of obstruction          Diet: Combination Diet Regular Diet Adult    DVT Prophylaxis: Pneumatic Compression Devices  Paul Catheter: Not present  Lines: None     Cardiac Monitoring: None  Code Status: Full Code      Clinically Significant Risk Factors           # Hypocalcemia: Lowest Ca = 8.3 mg/dL in last 2 days, will monitor and replace as appropriate                        # Financial/Environmental Concerns: none         Disposition Plan     Medically Ready for Discharge: Anticipated Tomorrow             Viky Johnston MD  Hospitalist Service  [facility]  Securely message with [employer] (more info)  Text page via [employer] Paging/Directory   ______________________________________________________________________    Interval History   Mr. García is doing well today.  He is having a little bit of trouble taking in a full breath.  I discussed with him his pneumonia and antibiotics.  Will likely be able to go home tomorrow.  His kidney function is improving and he is not supplementary oxygen.    Physical Exam   Vital Signs: Temp: 98.1  F (36.7   C) Temp src: Oral BP: 107/58 Pulse: 67   Resp: 18 SpO2: 98 % O2 Device: None (Room air)    Weight: 140 lbs 3.4 oz    General Appearance: Awake, alert, in no acute distress  Respiratory: CTAB, no wheeze  Cardiovascular: RRR, no murmur noted  GI: soft, nontender, non distended, normal bowel sounds  Skin: no jaundice, no rash      Medical Decision Making       50 MINUTES SPENT BY ME on the date of service doing chart review, history, exam, documentation & further activities per the note.      Data     I have personally reviewed the following data over the past 24 hrs:    11.6 (H)  \   11.4 (L)   / 161     137 106 21.3 (H) /  93   4.0 20 (L) 1.39 (H) \     ALT: 12 AST: 23 AP: 48 TBILI: 0.8   ALB: 3.7 TOT PROTEIN: 6.8 LIPASE: N/A       Imaging results reviewed over the past 24 hrs:   No results found for this or any previous visit (from the past 24 hours).

## 2024-11-06 NOTE — PLAN OF CARE
Patient is alert and oriented on room air. Pt is independent in room. Patient is on IVF at 100 ml/hr.     Vin Goncalves RN      Problem: Adult Inpatient Plan of Care  Goal: Plan of Care Review  Description: The Plan of Care Review/Shift note should be completed every shift.  The Outcome Evaluation is a brief statement about your assessment that the patient is improving, declining, or no change.  This information will be displayed automatically on your shift  note.  11/5/2024 2234 by Vin Goncalves RN  Outcome: Progressing  11/5/2024 1719 by Vin Goncalves, RN  Outcome: Progressing   Goal Outcome Evaluation:

## 2024-11-06 NOTE — PLAN OF CARE
Problem: Adult Inpatient Plan of Care  Goal: Optimal Comfort and Wellbeing  Outcome: Progressing   Goal Outcome Evaluation:       Pt. Pleasant and cooperative, able to make needs known, denies pain, up independently in room, remains on IV abx, eating well, sat% mid 90's on RA,  will cont to monitor.

## 2024-11-06 NOTE — PLAN OF CARE
Goal Outcome Evaluation:      Plan of Care Reviewed With: patient          Outcome Evaluation: Patient plans to return home when medically clear.

## 2024-11-06 NOTE — PLAN OF CARE
Soft BP's, asymptomatic, low grade fever and productive cough, PRN's given with relief.    Goal Outcome Evaluation:  Problem: Adult Inpatient Plan of Care  Goal: Optimal Comfort and Wellbeing  Intervention: Monitor Pain and Promote Comfort  Recent Flowsheet Documentation  Taken 11/6/2024 0300 by Grace Laurent, RN  Pain Management Interventions: medication (see MAR)     Problem: Infection  Goal: Absence of Infection Signs and Symptoms  Outcome: Progressing     Problem: Comorbidity Management  Goal: Blood Pressure in Desired Range  Outcome: Progressing

## 2024-11-06 NOTE — CONSULTS
Care Management Initial Consult    General Information  Assessment completed with: Patient,    Type of CM/SW Visit: Initial Assessment    Primary Care Provider verified and updated as needed: Yes   Readmission within the last 30 days: no previous admission in last 30 days      Reason for Consult: discharge planning  Advance Care Planning:            Communication Assessment  Patient's communication style: spoken language (English or Bilingual)    Hearing Difficulty or Deaf: no   Wear Glasses or Blind: yes    Cognitive  Cognitive/Neuro/Behavioral: WDL                      Living Environment:   People in home: child(geo), adult, parent(s)     Current living Arrangements: house      Able to return to prior arrangements: yes       Family/Social Support:  Care provided by: self  Provides care for: no one  Marital Status:   Support system: Children, Parent(s)          Description of Support System: Supportive, Involved         Current Resources:   Patient receiving home care services: No        Community Resources: None  Equipment currently used at home: none  Supplies currently used at home: None    Employment/Financial:  Employment Status: employed full-time        Financial Concerns: none   Referral to Financial Worker: No       Does the patient's insurance plan have a 3 day qualifying hospital stay waiver?  No    Lifestyle & Psychosocial Needs:  Social Drivers of Health     Food Insecurity: Low Risk  (11/6/2024)    Food Insecurity     Within the past 12 months, did you worry that your food would run out before you got money to buy more?: No     Within the past 12 months, did the food you bought just not last and you didn t have money to get more?: No   Depression: Not at risk (8/27/2024)    [facility]    PHQ-2     PHQ-2 Score: 0   Housing Stability: High Risk (11/6/2024)    Housing Stability     Do you have housing? : No     Are you worried about losing your housing?: No   Tobacco Use: High  Risk (9/25/2024)    Received from [facility]    Patient History     Smoking Tobacco Use: Every Day     Smokeless Tobacco Use: Unknown     Passive Exposure: Not on file   Financial Resource Strain: Low Risk  (11/6/2024)    Financial Resource Strain     Within the past 12 months, have you or your family members you live with been unable to get utilities (heat, electricity) when it was really needed?: No   Alcohol Use: Not on file   Transportation Needs: Low Risk  (11/6/2024)    Transportation Needs     Within the past 12 months, has lack of transportation kept you from medical appointments, getting your medicines, non-medical meetings or appointments, work, or from getting things that you need?: No   Physical Activity: Not on file   Interpersonal Safety: Low Risk  (11/6/2024)    Interpersonal Safety     Do you feel physically and emotionally safe where you currently live?: Yes     Within the past 12 months, have you been hit, slapped, kicked or otherwise physically hurt by someone?: No     Within the past 12 months, have you been humiliated or emotionally abused in other ways by your partner or ex-partner?: No   Stress: Not on file   Social Connections: Not on file   Health Literacy: Not on file       Functional Status:  Prior to admission patient needed assistance:   Dependent ADLs:: Independent  Dependent IADLs:: Independent       Mental Health Status:  Mental Health Status: No Current Concerns       Chemical Dependency Status:  Chemical Dependency Status: No Current Concerns             Values/Beliefs:  Spiritual, Cultural Beliefs, [employer]an Practices, Values that affect care: no               Discussed  Partnership in Safe Discharge Planning  document with patient/family: NA    Additional Information:  Received [facility] consult for housing. SW met with patient at bedside; introduced self and CM role. Pt lives in a house with his daughter and his parents. Pt is independent with all I/ADLs, works full time. Pt  denies any CM needs.     Next Steps: No further care management intervention anticipated at this time.  Please re-consult if further needs arise.  Care management signing off.        PB Patiño at 11:55 AM on 11/06/24

## 2024-11-07 VITALS
TEMPERATURE: 98.5 F | BODY MASS INDEX: 18.99 KG/M2 | SYSTOLIC BLOOD PRESSURE: 105 MMHG | HEART RATE: 88 BPM | RESPIRATION RATE: 20 BRPM | DIASTOLIC BLOOD PRESSURE: 60 MMHG | OXYGEN SATURATION: 98 % | HEIGHT: 72 IN | WEIGHT: 140.21 LBS

## 2024-11-07 PROBLEM — N17.9 AKI (ACUTE KIDNEY INJURY) (H): Status: RESOLVED | Noted: 2024-11-05 | Resolved: 2024-11-07

## 2024-11-07 LAB
ANION GAP SERPL CALCULATED.3IONS-SCNC: 9 MMOL/L (ref 7–15)
BUN SERPL-MCNC: 15 MG/DL (ref 6–20)
CALCIUM SERPL-MCNC: 8.5 MG/DL (ref 8.8–10.4)
CHLORIDE SERPL-SCNC: 109 MMOL/L (ref 98–107)
CREAT SERPL-MCNC: 1.12 MG/DL (ref 0.67–1.17)
EGFRCR SERPLBLD CKD-EPI 2021: 76 ML/MIN/1.73M2
ERYTHROCYTE [DISTWIDTH] IN BLOOD BY AUTOMATED COUNT: 12.8 % (ref 10–15)
GLUCOSE SERPL-MCNC: 106 MG/DL (ref 70–99)
HCO3 SERPL-SCNC: 22 MMOL/L (ref 22–29)
HCT VFR BLD AUTO: 36.9 % (ref 40–53)
HGB BLD-MCNC: 11.9 G/DL (ref 13.3–17.7)
MCH RBC QN AUTO: 31.7 PG (ref 26.5–33)
MCHC RBC AUTO-ENTMCNC: 32.2 G/DL (ref 31.5–36.5)
MCV RBC AUTO: 98 FL (ref 78–100)
PLATELET # BLD AUTO: 186 10E3/UL (ref 150–450)
POTASSIUM SERPL-SCNC: 4 MMOL/L (ref 3.4–5.3)
RBC # BLD AUTO: 3.75 10E6/UL (ref 4.4–5.9)
SODIUM SERPL-SCNC: 140 MMOL/L (ref 135–145)
WBC # BLD AUTO: 6.9 10E3/UL (ref 4–11)

## 2024-11-07 PROCEDURE — 36415 COLL VENOUS BLD VENIPUNCTURE: CPT | Performed by: INTERNAL MEDICINE

## 2024-11-07 PROCEDURE — 80048 BASIC METABOLIC PNL TOTAL CA: CPT | Performed by: INTERNAL MEDICINE

## 2024-11-07 PROCEDURE — 85018 HEMOGLOBIN: CPT | Performed by: INTERNAL MEDICINE

## 2024-11-07 PROCEDURE — 250N000011 HC RX IP 250 OP 636: Performed by: EMERGENCY MEDICINE

## 2024-11-07 PROCEDURE — 82947 ASSAY GLUCOSE BLOOD QUANT: CPT | Performed by: INTERNAL MEDICINE

## 2024-11-07 PROCEDURE — 258N000003 HC RX IP 258 OP 636: Performed by: EMERGENCY MEDICINE

## 2024-11-07 PROCEDURE — 99239 HOSP IP/OBS DSCHRG MGMT >30: CPT | Performed by: INTERNAL MEDICINE

## 2024-11-07 RX ADMIN — AZITHROMYCIN MONOHYDRATE 500 MG: 500 INJECTION, POWDER, LYOPHILIZED, FOR SOLUTION INTRAVENOUS at 10:14

## 2024-11-07 RX ADMIN — CEFTRIAXONE SODIUM 1 G: 1 INJECTION, POWDER, FOR SOLUTION INTRAMUSCULAR; INTRAVENOUS at 09:06

## 2024-11-07 ASSESSMENT — ACTIVITIES OF DAILY LIVING (ADL)
ADLS_ACUITY_SCORE: 0

## 2024-11-07 NOTE — DISCHARGE SUMMARY
[facility]  Hospitalist Discharge Summary      Date of Admission:  11/5/2024  Date of Discharge:  11/7/2024  2:21 PM  Discharging Provider: Viky Johnston MD  Discharge Service: Hospitalist Service    Discharge Diagnoses   Community-acquired pneumonia:   SCOTT- close to baseline at discharge  Nephrolithiasis    Clinically Significant Risk Factors          Follow-ups Needed After Discharge   Follow-up Appointments       Hospital Follow-up with Existing Primary Care Provider (PCP)      Please see details below         Schedule Primary Care visit within: 30 Days               Unresulted Labs Ordered in the Past 30 Days of this Admission       Date and Time Order Name Status Description    11/5/2024  9:07 AM Blood Culture Peripheral Blood Preliminary         These results will be followed up by Viky Johnston    Discharge Disposition   Discharged to home  Condition at discharge: Stable    Hospital Course   Denys García is a 59 year old male with a history of chronic kidney disease, Crohn's disease and kidney stones admitted with pneumonia.     Community-acquired pneumonia:   -CT scan showed airway centric ground glass inflammation in the right middle lobe consistent with pneumonia.    -White count elevated at 19.6.  -Not requiring supplementary oxygen  -Started on IV Rocephin and Zithromax in patient, completed 3 days of azithromycin, dicharged on augmentin    SCOTT- close to baseline at discharge  -Creatinine on admission 1.91 with most recent baseline around 1.0  -Has decreased to 1.12    nephrolithiasis  -He has known multiple nonobstructing right renal calculi largest of which is 5 mm in the lower pole.  No evidence of obstruction    Consultations This Hospital Stay   CARE MANAGEMENT / SOCIAL WORK IP CONSULT    Code Status   Full Code    Time Spent on this Encounter   I, Viky Johnston MD, personally saw the patient today and spent greater than 30 minutes discharging this patient.        Viky Johnston MD  [facility]  [address] [address]  Phone: [phone]  Fax: [phone]  ______________________________________________________________________    Physical Exam   Vital Signs: Temp: 98.2  F (36.8  C) Temp src: Oral BP: 105/60 Pulse: 60   Resp: 18 SpO2: 98 % O2 Device: None (Room air)    Weight: 140 lbs 3.4 oz    General Appearance: Awake, alert, in no acute distress  Respiratory: CTAB, no wheeze  Cardiovascular: RRR, no murmur noted  GI: soft, nontender, non distended, normal bowel sounds  Skin: no jaundice, no rash       Primary Care Physician   Susan Jacobson    Discharge Orders      Primary Care - Care Coordination Referral      Reason for your hospital stay    Pneumonia, acute kidney injury     Activity    Your activity upon discharge: activity as tolerated     Diet    Follow this diet upon discharge: Regular     Hospital Follow-up with Existing Primary Care Provider (PCP)    Please see details below            Significant Results and Procedures   Most Recent 3 CBC's:  Recent Labs   Lab Test 11/07/24  0654 11/06/24  0549 11/05/24  0738   WBC 6.9 11.6* 19.6*   HGB 11.9* 11.4* 13.2*   MCV 98 96 99    161 204     Most Recent 3 BMP's:  Recent Labs   Lab Test 11/07/24  0654 11/06/24  0549 11/05/24  0738    137 137   POTASSIUM 4.0 4.0 4.3   CHLORIDE 109* 106 102   CO2 22 20* 21*   BUN 15.0 21.3* 16.9   CR 1.12 1.39* 1.91*   ANIONGAP 9 11 14   SHARATH 8.5* 8.3* 9.3   * 93 118*   ,   Results for orders placed or performed during the hospital encounter of 11/05/24   CT Abdomen Pelvis w/o Contrast    Narrative    EXAM: CT ABDOMEN PELVIS W/O CONTRAST  LOCATION: [facility]  DATE: 11/5/2024    INDICATION: flank pain  COMPARISON: CT of the abdomen and pelvis 7/8/2024  TECHNIQUE: CT scan of the abdomen and pelvis was performed without IV contrast. Multiplanar reformats were obtained. Dose reduction  techniques were used.  CONTRAST: None.    FINDINGS:   LOWER CHEST: Airway centric/centrilobular groundglass attenuation nodules are present in the imaged right middle lobe consistent with pneumonia. Atelectasis is present in both posterior costophrenic sulci.    HEPATOBILIARY: Stable 11 mm benign cyst in the anterior left lobe of the liver. No new liver lesions. Smooth liver capsule. Gallbladder and bile ducts are normal.    PANCREAS: Normal.    SPLEEN: Normal.    ADRENAL GLANDS: Stable 18 x 20 mm right adrenal nodule consistent with a benign adenoma. Left adrenal gland is normal.    KIDNEYS/BLADDER: Multiple calcifications are present at the cortical medullary junction of the right kidney in both the upper and lower pole. The largest calculus is in an anterior lower pole calyx measuring 5 mm (series 3, image 86). Large majority of   radiopaque calculi present in the left kidney 7/8/2024 have resolved. There is a solitary remaining punctate radiopaque calculus in a left upper pole calyx. No hydronephrosis or hydroureter. Urinary bladder is within normal limits.    BOWEL: Nondistended stomach and duodenum. Small bowel is normal in contour and caliber. There is an enterocolonic anastomosis in the right lower quadrant. Appendix is absent. No bowel distention or wall thickening. No inflammatory stranding in the   mesentery or peritoneal thickening.    LYMPH NODES: There are no new enlarged lymph nodes in the abdomen or pelvis.    VASCULATURE: Unchanged atheromatous calcifications of the infrarenal abdominal aorta. No aneurysm.    PELVIC ORGANS: Prostate gland is normal in size. Seminal vesicles are symmetric. No pelvic free fluid.    MUSCULOSKELETAL: Accentuated lumbar lordosis. Generalized bone demineralization. Low lumbar facet arthropathy.      Impression    IMPRESSION:     1.  Multiple nonobstructing right renal calculi are present largest of which is 5 mm in the lower pole. No urinary tract obstruction.  2.  Airway  centric groundglass inflammatory nodules in the right middle lobe consistent with pneumonia.  3.  Stable benign right adrenal adenoma. No imaging workup or follow-up is necessary.         Discharge Medications   Current Discharge Medication List        START taking these medications    Details   amoxicillin-clavulanate (AUGMENTIN) 875-125 MG tablet Take 1 tablet by mouth 2 times daily for 4 days.  Qty: 8 tablet, Refills: 0    Associated Diagnoses: Pneumonia of right middle lobe due to infectious organism           CONTINUE these medications which have NOT CHANGED    Details   aspirin-acetaminophen-caffeine (EXCEDRIN MIGRAINE) 250-250-65 mg per tablet [ASPIRIN-ACETAMINOPHEN-CAFFEINE (EXCEDRIN MIGRAINE) 250-250-65 MG PER TABLET] Take 2 tablets by mouth every 6 (six) hours as needed for pain.      hydrocortisone 2.5 % cream Apply topically daily as needed for rash.      mupirocin (BACTROBAN) 2 % external ointment Apply topically daily as needed.      inFLIXimab (REMICADE) 100 mg injection [INFLIXIMAB (REMICADE) 100 MG INJECTION] Infuse into a venous catheter. Get injection every eight weeks at the clinic           Allergies   Allergies   Allergen Reactions    Contrast Dye Rash     Itchy about five years ago  Itchy about five years ago      Iodinated Contrast Media [Iodinated Contrast Media] Rash     Itchy about five years ago

## 2024-11-07 NOTE — PLAN OF CARE
Problem: Adult Inpatient Plan of Care  Goal: Optimal Comfort and Wellbeing  11/7/2024 0441 by Gera Carrillo RN  Outcome: Progressing  11/6/2024 2153 by Gera Carrillo RN  Outcome: Progressing     Problem: Skin Injury Risk Increased  Goal: Skin Health and Integrity  11/7/2024 0441 by Gera Carrillo RN  Outcome: Progressing  11/6/2024 2153 by Gera Carrillo RN  Outcome: Progressing  Intervention: Plan: Nurse Driven Intervention: Moisture Management  Recent Flowsheet Documentation  Taken 11/7/2024 0100 by Gera Carrillo RN  Moisture Interventions: Encourage regular toileting  Taken 11/6/2024 2000 by Gera Carrillo RN  Moisture Interventions: Encourage regular toileting  Taken 11/6/2024 1953 by Gera Carrillo RN  Moisture Interventions: Encourage regular toileting  Bathing/Skin Care: linen changed  Intervention: Optimize Skin Protection  Recent Flowsheet Documentation  Taken 11/7/2024 0100 by Gera Carrillo RN  Activity Management: activity adjusted per tolerance  Head of Bed (HOB) Positioning: HOB at 20-30 degrees  Taken 11/6/2024 2000 by Gera Carrillo RN  Activity Management: activity adjusted per tolerance  Head of Bed (HOB) Positioning: HOB at 20-30 degrees     Problem: Infection  Goal: Absence of Infection Signs and Symptoms  Outcome: Progressing     Problem: Comorbidity Management  Goal: Blood Pressure in Desired Range  Outcome: Progressing   Goal Outcome Evaluation:    VSS. Given melatonin for sleep, slept between cares.     Fine crackles auscultated in RML and RLL. Pt denies SOB.    Independent in room.           Gera Carrillo RN

## 2024-11-07 NOTE — PROGRESS NOTES
Pt discharged to home via mother and daughter. Verbalizes understanding discharge instructions, follow up appointments and new medications. Pt will  his prescription. States he has all of his personal items.

## 2024-11-10 LAB — BACTERIA BLD CULT: NO GROWTH

## 2025-04-07 ENCOUNTER — LAB REQUISITION (OUTPATIENT)
Dept: LAB | Facility: CLINIC | Age: 60
End: 2025-04-07

## 2025-04-07 DIAGNOSIS — Z01.818 ENCOUNTER FOR OTHER PREPROCEDURAL EXAMINATION: ICD-10-CM

## 2025-04-07 PROCEDURE — 80048 BASIC METABOLIC PNL TOTAL CA: CPT | Performed by: FAMILY MEDICINE

## 2025-04-07 PROCEDURE — 80051 ELECTROLYTE PANEL: CPT | Performed by: FAMILY MEDICINE

## 2025-04-08 LAB
ANION GAP SERPL CALCULATED.3IONS-SCNC: 13 MMOL/L (ref 7–15)
BUN SERPL-MCNC: 13.5 MG/DL (ref 6–20)
CALCIUM SERPL-MCNC: 9.5 MG/DL (ref 8.8–10.4)
CHLORIDE SERPL-SCNC: 101 MMOL/L (ref 98–107)
CREAT SERPL-MCNC: 1.09 MG/DL (ref 0.67–1.17)
EGFRCR SERPLBLD CKD-EPI 2021: 79 ML/MIN/1.73M2
GLUCOSE SERPL-MCNC: 135 MG/DL (ref 70–99)
HCO3 SERPL-SCNC: 24 MMOL/L (ref 22–29)
POTASSIUM SERPL-SCNC: 4.6 MMOL/L (ref 3.4–5.3)
SODIUM SERPL-SCNC: 138 MMOL/L (ref 135–145)